# Patient Record
Sex: FEMALE | Race: WHITE | NOT HISPANIC OR LATINO | Employment: OTHER | URBAN - METROPOLITAN AREA
[De-identification: names, ages, dates, MRNs, and addresses within clinical notes are randomized per-mention and may not be internally consistent; named-entity substitution may affect disease eponyms.]

---

## 2017-04-18 ENCOUNTER — OFFICE VISIT - RIVER FALLS (OUTPATIENT)
Dept: FAMILY MEDICINE | Facility: CLINIC | Age: 62
End: 2017-04-18

## 2017-10-09 ENCOUNTER — AMBULATORY - RIVER FALLS (OUTPATIENT)
Dept: FAMILY MEDICINE | Facility: CLINIC | Age: 62
End: 2017-10-09

## 2017-10-10 LAB
CHOLEST SERPL-MCNC: 245 MG/DL
CHOLEST/HDLC SERPL: 1.8 {RATIO}
GLUCOSE BLD-MCNC: 88 MG/DL (ref 65–99)
HDLC SERPL-MCNC: 134 MG/DL
LDLC SERPL CALC-MCNC: 97 MG/DL
NONHDLC SERPL-MCNC: 111 MG/DL
TRIGL SERPL-MCNC: 62 MG/DL

## 2017-10-12 ENCOUNTER — OFFICE VISIT - RIVER FALLS (OUTPATIENT)
Dept: FAMILY MEDICINE | Facility: CLINIC | Age: 62
End: 2017-10-12

## 2017-10-12 ASSESSMENT — MIFFLIN-ST. JEOR: SCORE: 1138.93

## 2018-10-12 ENCOUNTER — AMBULATORY - RIVER FALLS (OUTPATIENT)
Dept: FAMILY MEDICINE | Facility: CLINIC | Age: 63
End: 2018-10-12
Payer: MEDICARE

## 2018-10-13 LAB
CHOLEST SERPL-MCNC: 252 MG/DL
CHOLEST/HDLC SERPL: 2 {RATIO}
CREAT SERPL-MCNC: 0.77 MG/DL (ref 0.5–0.99)
GLUCOSE BLD-MCNC: 86 MG/DL (ref 65–99)
HDLC SERPL-MCNC: 127 MG/DL
LDLC SERPL CALC-MCNC: 111 MG/DL
NONHDLC SERPL-MCNC: 125 MG/DL
TRIGL SERPL-MCNC: 53 MG/DL

## 2018-10-18 ENCOUNTER — OFFICE VISIT - RIVER FALLS (OUTPATIENT)
Dept: FAMILY MEDICINE | Facility: CLINIC | Age: 63
End: 2018-10-18
Payer: MEDICARE

## 2018-10-18 ASSESSMENT — MIFFLIN-ST. JEOR: SCORE: 1199.37

## 2018-11-09 ENCOUNTER — AMBULATORY - RIVER FALLS (OUTPATIENT)
Dept: FAMILY MEDICINE | Facility: CLINIC | Age: 63
End: 2018-11-09
Payer: COMMERCIAL

## 2019-10-14 ENCOUNTER — COMMUNICATION - RIVER FALLS (OUTPATIENT)
Dept: FAMILY MEDICINE | Facility: CLINIC | Age: 64
End: 2019-10-14
Payer: MEDICARE

## 2019-10-23 ENCOUNTER — AMBULATORY - RIVER FALLS (OUTPATIENT)
Dept: FAMILY MEDICINE | Facility: CLINIC | Age: 64
End: 2019-10-23
Payer: MEDICARE

## 2019-10-24 LAB
BUN SERPL-MCNC: 9 MG/DL (ref 7–25)
BUN/CREAT RATIO - HISTORICAL: NORMAL (ref 6–22)
CALCIUM SERPL-MCNC: 9.1 MG/DL (ref 8.6–10.4)
CHLORIDE BLD-SCNC: 106 MMOL/L (ref 98–110)
CHOLEST SERPL-MCNC: 227 MG/DL
CHOLEST/HDLC SERPL: 2.2 {RATIO}
CO2 SERPL-SCNC: 27 MMOL/L (ref 20–32)
CREAT SERPL-MCNC: 0.82 MG/DL (ref 0.5–0.99)
EGFRCR SERPLBLD CKD-EPI 2021: 75 ML/MIN/1.73M2
GLUCOSE BLD-MCNC: 92 MG/DL (ref 65–99)
HDLC SERPL-MCNC: 102 MG/DL
HGB BLD-MCNC: 13.1 GM/DL (ref 11.7–15.5)
LDLC SERPL CALC-MCNC: 110 MG/DL
NONHDLC SERPL-MCNC: 125 MG/DL
POTASSIUM BLD-SCNC: 4.3 MMOL/L (ref 3.5–5.3)
SODIUM SERPL-SCNC: 141 MMOL/L (ref 135–146)
TRIGL SERPL-MCNC: 61 MG/DL

## 2019-11-01 ENCOUNTER — OFFICE VISIT - RIVER FALLS (OUTPATIENT)
Dept: FAMILY MEDICINE | Facility: CLINIC | Age: 64
End: 2019-11-01
Payer: MEDICARE

## 2019-11-01 ASSESSMENT — MIFFLIN-ST. JEOR: SCORE: 1205.72

## 2019-11-04 ENCOUNTER — COMMUNICATION - RIVER FALLS (OUTPATIENT)
Dept: FAMILY MEDICINE | Facility: CLINIC | Age: 64
End: 2019-11-04
Payer: MEDICARE

## 2019-11-08 ENCOUNTER — OFFICE VISIT - RIVER FALLS (OUTPATIENT)
Dept: FAMILY MEDICINE | Facility: CLINIC | Age: 64
End: 2019-11-08
Payer: MEDICARE

## 2020-01-20 LAB — COLOGUARD-ABSTRACT: NEGATIVE

## 2020-03-16 ENCOUNTER — COMMUNICATION - RIVER FALLS (OUTPATIENT)
Dept: FAMILY MEDICINE | Facility: CLINIC | Age: 65
End: 2020-03-16
Payer: MEDICARE

## 2020-05-22 ENCOUNTER — OFFICE VISIT - RIVER FALLS (OUTPATIENT)
Dept: FAMILY MEDICINE | Facility: CLINIC | Age: 65
End: 2020-05-22
Payer: MEDICARE

## 2020-05-22 ENCOUNTER — AMBULATORY - RIVER FALLS (OUTPATIENT)
Dept: FAMILY MEDICINE | Facility: CLINIC | Age: 65
End: 2020-05-22
Payer: MEDICARE

## 2020-05-22 ENCOUNTER — COMMUNICATION - RIVER FALLS (OUTPATIENT)
Dept: FAMILY MEDICINE | Facility: CLINIC | Age: 65
End: 2020-05-22
Payer: MEDICARE

## 2020-05-22 LAB
ALBUMIN UR-MCNC: ABNORMAL G/DL
BACTERIA #/AREA URNS HPF: NORMAL /[HPF]
BILIRUB UR QL STRIP: NEGATIVE
EPITHELIAL CELLS UR: NORMAL
GLUCOSE UR STRIP-MCNC: NEGATIVE MG/DL
HGB UR QL STRIP: ABNORMAL
KETONES UR STRIP-MCNC: NEGATIVE MG/DL
LEUKOCYTE ESTERASE UR QL STRIP: ABNORMAL
NITRATE UR QL: NEGATIVE
PH UR STRIP: 6 [PH] (ref 5–8)
RBC #/AREA URNS AUTO: NORMAL /[HPF]
SP GR UR STRIP: ABNORMAL (ref 1–1.03)
WBC #/AREA URNS AUTO: NORMAL /[HPF]
WBC CLUMPS #/AREA URNS HPF: PRESENT /[HPF]

## 2020-06-01 ENCOUNTER — COMMUNICATION - RIVER FALLS (OUTPATIENT)
Dept: FAMILY MEDICINE | Facility: CLINIC | Age: 65
End: 2020-06-01
Payer: MEDICARE

## 2020-08-19 ENCOUNTER — COMMUNICATION - RIVER FALLS (OUTPATIENT)
Dept: FAMILY MEDICINE | Facility: CLINIC | Age: 65
End: 2020-08-19
Payer: MEDICARE

## 2020-10-16 ENCOUNTER — COMMUNICATION - RIVER FALLS (OUTPATIENT)
Dept: FAMILY MEDICINE | Facility: CLINIC | Age: 65
End: 2020-10-16
Payer: MEDICARE

## 2020-11-09 ENCOUNTER — AMBULATORY - RIVER FALLS (OUTPATIENT)
Dept: FAMILY MEDICINE | Facility: CLINIC | Age: 65
End: 2020-11-09
Payer: MEDICARE

## 2020-11-10 ENCOUNTER — COMMUNICATION - RIVER FALLS (OUTPATIENT)
Dept: FAMILY MEDICINE | Facility: CLINIC | Age: 65
End: 2020-11-10
Payer: MEDICARE

## 2020-11-10 LAB
BUN SERPL-MCNC: 15 MG/DL (ref 7–25)
BUN/CREAT RATIO - HISTORICAL: NORMAL (ref 6–22)
CALCIUM SERPL-MCNC: 9.3 MG/DL (ref 8.6–10.4)
CHLORIDE BLD-SCNC: 101 MMOL/L (ref 98–110)
CHOLEST SERPL-MCNC: 245 MG/DL
CHOLEST/HDLC SERPL: 2.2 {RATIO}
CO2 SERPL-SCNC: 27 MMOL/L (ref 20–32)
CREAT SERPL-MCNC: 0.74 MG/DL (ref 0.5–0.99)
EGFRCR SERPLBLD CKD-EPI 2021: 84 ML/MIN/1.73M2
GLUCOSE BLD-MCNC: 90 MG/DL (ref 65–99)
HDLC SERPL-MCNC: 111 MG/DL
HGB BLD-MCNC: 14.2 GM/DL (ref 11.7–15.5)
LDLC SERPL CALC-MCNC: 120 MG/DL
NONHDLC SERPL-MCNC: 134 MG/DL
POTASSIUM BLD-SCNC: 4.2 MMOL/L (ref 3.5–5.3)
SODIUM SERPL-SCNC: 136 MMOL/L (ref 135–146)
TRIGL SERPL-MCNC: 53 MG/DL

## 2020-12-02 ENCOUNTER — OFFICE VISIT - RIVER FALLS (OUTPATIENT)
Dept: FAMILY MEDICINE | Facility: CLINIC | Age: 65
End: 2020-12-02
Payer: MEDICARE

## 2021-01-25 ENCOUNTER — COMMUNICATION - RIVER FALLS (OUTPATIENT)
Dept: FAMILY MEDICINE | Facility: CLINIC | Age: 66
End: 2021-01-25
Payer: MEDICARE

## 2021-03-05 ENCOUNTER — COMMUNICATION - RIVER FALLS (OUTPATIENT)
Dept: FAMILY MEDICINE | Facility: CLINIC | Age: 66
End: 2021-03-05
Payer: MEDICARE

## 2021-06-02 ENCOUNTER — RECORDS - HEALTHEAST (OUTPATIENT)
Dept: ADMINISTRATIVE | Facility: CLINIC | Age: 66
End: 2021-06-02

## 2021-09-23 ENCOUNTER — OFFICE VISIT - RIVER FALLS (OUTPATIENT)
Dept: FAMILY MEDICINE | Facility: CLINIC | Age: 66
End: 2021-09-23
Payer: MEDICARE

## 2021-12-07 ENCOUNTER — AMBULATORY - RIVER FALLS (OUTPATIENT)
Dept: FAMILY MEDICINE | Facility: CLINIC | Age: 66
End: 2021-12-07
Payer: MEDICARE

## 2021-12-08 ENCOUNTER — COMMUNICATION - RIVER FALLS (OUTPATIENT)
Dept: FAMILY MEDICINE | Facility: CLINIC | Age: 66
End: 2021-12-08
Payer: MEDICARE

## 2021-12-08 LAB
BUN SERPL-MCNC: 8 MG/DL (ref 7–25)
BUN/CREAT RATIO - HISTORICAL: NORMAL (ref 6–22)
CALCIUM SERPL-MCNC: 9 MG/DL (ref 8.6–10.4)
CHLORIDE BLD-SCNC: 104 MMOL/L (ref 98–110)
CHOLEST SERPL-MCNC: 250 MG/DL
CHOLEST/HDLC SERPL: 2.3 {RATIO}
CO2 SERPL-SCNC: 29 MMOL/L (ref 20–32)
CREAT SERPL-MCNC: 0.79 MG/DL (ref 0.5–0.99)
EGFRCR SERPLBLD CKD-EPI 2021: 77 ML/MIN/1.73M2
GLUCOSE BLD-MCNC: 84 MG/DL (ref 65–99)
HDLC SERPL-MCNC: 110 MG/DL
HGB BLD-MCNC: 13.1 GM/DL (ref 11.7–15.5)
LDLC SERPL CALC-MCNC: 125 MG/DL
NONHDLC SERPL-MCNC: 140 MG/DL
POTASSIUM BLD-SCNC: 4.3 MMOL/L (ref 3.5–5.3)
SODIUM SERPL-SCNC: 140 MMOL/L (ref 135–146)
TRIGL SERPL-MCNC: 59 MG/DL

## 2021-12-14 ENCOUNTER — TRANSFERRED RECORDS (OUTPATIENT)
Dept: MULTI SPECIALTY CLINIC | Facility: CLINIC | Age: 66
End: 2021-12-14

## 2021-12-14 ENCOUNTER — OFFICE VISIT - RIVER FALLS (OUTPATIENT)
Dept: FAMILY MEDICINE | Facility: CLINIC | Age: 66
End: 2021-12-14
Payer: MEDICARE

## 2021-12-14 ASSESSMENT — MIFFLIN-ST. JEOR: SCORE: 1225.11

## 2022-02-11 VITALS — DIASTOLIC BLOOD PRESSURE: 72 MMHG | TEMPERATURE: 97.3 F | SYSTOLIC BLOOD PRESSURE: 140 MMHG | HEART RATE: 64 BPM

## 2022-02-12 VITALS
WEIGHT: 158.4 LBS | DIASTOLIC BLOOD PRESSURE: 78 MMHG | BODY MASS INDEX: 29.15 KG/M2 | HEIGHT: 62 IN | HEART RATE: 60 BPM | TEMPERATURE: 97 F | SYSTOLIC BLOOD PRESSURE: 136 MMHG

## 2022-02-12 VITALS
WEIGHT: 157 LBS | BODY MASS INDEX: 28.89 KG/M2 | TEMPERATURE: 98.5 F | SYSTOLIC BLOOD PRESSURE: 108 MMHG | HEART RATE: 80 BPM | HEIGHT: 62 IN | DIASTOLIC BLOOD PRESSURE: 62 MMHG

## 2022-02-12 VITALS
DIASTOLIC BLOOD PRESSURE: 80 MMHG | TEMPERATURE: 96.8 F | BODY MASS INDEX: 25.3 KG/M2 | HEIGHT: 63 IN | WEIGHT: 142.8 LBS | HEART RATE: 56 BPM | SYSTOLIC BLOOD PRESSURE: 128 MMHG

## 2022-02-12 VITALS
SYSTOLIC BLOOD PRESSURE: 131 MMHG | HEART RATE: 47 BPM | WEIGHT: 161.8 LBS | HEIGHT: 63 IN | OXYGEN SATURATION: 97 % | DIASTOLIC BLOOD PRESSURE: 70 MMHG | BODY MASS INDEX: 28.67 KG/M2 | TEMPERATURE: 97.7 F

## 2022-02-12 VITALS
BODY MASS INDEX: 30.12 KG/M2 | DIASTOLIC BLOOD PRESSURE: 79 MMHG | SYSTOLIC BLOOD PRESSURE: 145 MMHG | WEIGHT: 166 LBS | HEART RATE: 52 BPM

## 2022-02-15 NOTE — PROGRESS NOTES
Patient:   JANKI QUINTERO            MRN: [id number]            FIN: [id number]               Age:   65 years     Sex:  Female     :  1955   Associated Diagnoses:   Essential hypertension; Physical exam   Author:   Messi Park MD      Visit Information      Date of Service: 2020 06:48 am  Performing Location: [facility]  Encounter#: [id number]      Primary Care Provider (PCP):  Messi Park MD    NPI# [id number]      Referring Provider:  Messi Park MD    NPI# [id number]   Visit type:  Video Visit via [employer].    Participants in room during visit:  patient   Location of patient:  patient  Location of provider:  _ (Clinic office or Home office)  Video Start Time:  10:00  Video End Time:   10:25    Today's visit was conducted via video conference due to the COVID-19 pandemic.  The patient's consent to proceed with a video visit has been obtained and documented.      Chief Complaint   2020 10:19 AM CST   Video visit - f/u chronic - verbal lconsent for video visit        History of Present Illness   I spoke with Janki via video conference for general follow-up.  She has been doing as well as can be expected.  Understandably still grieving after her  s loss earlier this year compounded by the Covid pandemic.  She has remained healthy.  She has been socially isolating.  We discussed at length recent developments related to vaccine trials.  She describes her process is appropriate grieving.  Does have issues with depression in the past though does not feel like this is depression.         Review of Systems   Constitutional:  Negative.    Eye:  Negative.    Ear/Nose/Mouth/Throat:  Negative.    Respiratory:  Negative.    Cardiovascular:  Negative.    Gastrointestinal:  Negative.    Genitourinary:  Negative.    Hematology/Lymphatics:  Bruising tendency.    Immunologic:  Negative.    Musculoskeletal:  Negative.    Integumentary:  Negative.    Neurologic:  Negative.    Psychiatric:   Anxiety, No depression.       Health Status   Allergies:    Allergic Reactions (Selected)  No Known Medication Allergies   Medications:  (Selected)   Prescriptions  Prescribed  lisinopril 10 mg oral tablet: = 1 tab(s), Oral, daily, # 90 tab(s), 3 Refill(s), Type: Maintenance, Pharmacy: [facility] #[id number], due for appt, 1 tab(s) Oral daily, 62.25, in, 11/01/19 10:24:00 CDT, Height Measured, 158.4, lb, 11/01/19 10:24:00 CDT, Weight Measured  Documented Medications  Documented  Vitamin B Complex with C and Folic Acid oral capsule: 1 cap(s), po, daily, 0 Refill(s), Type: Maintenance  Vitamin D3 1000 intl units oral tablet: 2 tab(s) ( 2,000 International Unit ), po, daily, 0 Refill(s), Type: Maintenance  aspirin 81 mg oral tablet: 1 tab(s) ( 81 mg ), po, daily, 0 Refill(s), Type: Maintenance  vitamin E 400 intl units oral capsule: 1 cap(s) ( 400 International Unit ), po, daily, 0 Refill(s), Type: Maintenance,    Medications          *denotes recorded medication          *aspirin 81 mg oral tablet: 81 mg, 1 tab(s), po, daily, 0 Refill(s).          *Vitamin D3 1000 intl units oral tablet: 2,000 International Unit, 2 tab(s), po, daily, 0 Refill(s).          lisinopril 10 mg oral tablet: 1 tab(s), Oral, daily, 90 tab(s), 3 Refill(s).          *Vitamin B Complex with C and Folic Acid oral capsule: 1 cap(s), po, daily, 0 Refill(s).          *vitamin E 400 intl units oral capsule: 400 International Unit, 1 cap(s), po, daily, 0 Refill(s).       Problem list:    All Problems  Sarcoidosis / SNOMED CT 4023882997 / Confirmed  Essential hypertension / SNOMED CT 94073716 / Confirmed  Hilar adenopathy / SNOMED CT 800890172 / Confirmed  History of breast cancer / SNOMED CT 7648219675 / Confirmed  Menopausal state / SNOMED CT 536054474 / Confirmed      Histories   Past Medical History:    Active  History of breast cancer (0987928087): Onset in the month of 4/2011 at 56 years  Comments:  11/14/2016 CST 5:03 PM YOVANI Burrouhgs  Giulia  Stage t1c, n1  ER-positive; s/p XRT; s/p chemo  Menopausal state (918687088): Onset in  at 52 years.  Essential hypertension (61108304)  Hilar adenopathy (925806041)  Sarcoidosis (4482114464)  Resolved  Pregnancy (541238471):  Resolved in  at 21 years.  Pregnancy (278031950):  Resolved in  at 24 years.  Pregnancy (031573080):  Resolved in  at 27 years.   Family History:    Thyroid  Daughter (Mary)  Anemia  Great Grandmother (M) (Melany Lynn, )  CVA - Cerebrovascular accident  Father ()  CAD - Coronary artery disease  Father ()  Breast Cancer  Mother (): onset at 71 .  Comments:  2016 9:16 AM Gertrude More  Stage 4 at the time of the diagnosis.  Grandmother (P) (Ariela Barbosa, ): onset at 89 .     Procedure history:    Screening for malignant neoplasm of colon (5322622205) on 2020 at 64 Years.  Comments:  2020 8:49 AM Megha Jaramillo - Negative  Date of last mammogram (9875208020) on 3/8/2018 at 62 Years.  Screening for malignant neoplasm of colon (7078110674) on 2016 at 61 Years.  Comments:  2017 8:14 AM Nicolasa Spears MA result--negative  Recommendation:   f/u 3yrs  Breast cancer (096244026) in the month of 3/2011 at 55 Years.  Comments:  2016 9:30 AM Gertrude More  One positive sentinel node.    2016 9:13 AM Gertrude More  Right invasive ductal.  Wide local excision.  Tubal ligation (049199873) on 1997 at 42 Years.  Tonsillectomy (619712714).  Childbirth (6162755574).  Comments:  2016 9:25 AM Gertrude More  x 3   Social History:        Electronic Cigarette/Vaping Assessment            Electronic Cigarette Use: Never.      Alcohol Assessment            Current, Wine (5 oz), 1-2 times per month, 1 drinks/episode average.  Ready to change: No.      Tobacco Assessment            Never (less than 100 in lifetime)            Never (less than 100 in  lifetime)      Substance Abuse Assessment            Never      Employment and Education Assessment            Retired, Work/School description: .  Previous employment/school: ..      Home and Environment Assessment             Marital status:Tirso Chen Spouse/Partner name:.      Nutrition and Health Assessment            Type of diet: Regular.  Wants to lose weight: Yes.  Sleeping concerns: No.  Feels highly stressed: Yes.      Exercise and Physical Activity Assessment            Exercise frequency: Daily.  Exercise type: Walking, Yoga.      Sexual Assessment            Sexually active: Yes.  Sexual orientation: Straight or heterosexual.  Contraceptive Use Details: None.        Physical Examination   General:  Alert and oriented, No acute distress.    Eye:  Normal conjunctiva.    HENT:  Oral mucosa is moist.    Respiratory:  Respirations are non-labored.    Psychiatric:  Cooperative, Appropriate mood & affect, Normal judgment.       Impression and Plan   Diagnosis     Essential hypertension (IUU46-PL I10).     Physical exam (FLR15-UC Z00.00).        Review / Management     .) h/o stage I breast Ca, '11; + sentinel node; negative right axillary LN dissection; high Onco-score   - treated with XRT and chemo   - completed 4.5 yrs of Arimidex; stopped due to polyarthralgia   - maintains surveillance through [facility] for mammography    .) hypertension, controlled   - lisinopril 10mg daily    .) health maintenance   - DEXA, '19 - normal   - no longer needing PAP   - normal Cologuard (1/2020); repeat '23   - adult vaccinations updated   - expected grieving with deterioration of Gustavo's health    .) Covid19 pandemic  - discussed importance of social distancing, hand hygiene, and unique aspects related to individualized health  - discussed s/s and appropriate measures in context of worsening or developing respiratory symptoms     RTC annually

## 2022-02-15 NOTE — TELEPHONE ENCOUNTER
Entered by Gertrude Bennett CMA on October 14, 2019 8:19:37 AM CDT  ---------------------  From: Gertrude Bennett CMA   [id number]    Sent: 10/14/2019 8:19:37 AM CDT  Subject: Medication Management     ** Submitted: **  Order:lisinopril (lisinopril 10 mg oral tablet)  1 tab(s)  Oral  daily  Qty:  30 tab(s)        Refills:  0          Substitutions Allowed     Route To [id number]    Signed by Gertrude Bennett CMA  10/14/2019 8:19:00 AM    ** Submitted: **  Complete:lisinopril (lisinopril 10 mg oral tablet)   Signed by Gertrude Bennett CMA  10/14/2019 8:19:00 AM    ** Not Approved:  **  lisinopril (LISINOPRIL 10MG TABLETS)  TAKE ONE TABLET BY MOUTH EVERY DAY  Qty:  90 tab(s)        Days Supply:  90        Refills:  0          Substitutions Allowed     Route To [id number]   Signed by Gertrude Bennett CMA            ** Patient matched by Gertrude Bennett CMA on 10/14/2019 8:19:15 AM CDT **      ------------------------------------------  [id number]  To: Messi Park MD  Sent: October 12, 2019 6:59:47 AM CDT  Subject: Medication Management  Due: October 13, 2019 6:59:47 AM CDT    ** On Hold Pending Signature **  Drug: lisinopril (lisinopril 10 mg oral tablet)  TAKE ONE TABLET BY MOUTH EVERY DAY  Quantity: 90 tab(s)  Days Supply: 90  Refills: 0  Substitutions Allowed  Notes from Pharmacy:     Dispensed Drug: lisinopril (lisinopril 10 mg oral tablet)  TAKE ONE TABLET BY MOUTH EVERY DAY  Quantity: 90 tab(s)  Days Supply: 90  Refills: 0  Substitutions Allowed  Notes from Pharmacy:   ------------------------------------------pt sent message stating she's due for appt and asking to complete labs ahead of time

## 2022-02-15 NOTE — NURSING NOTE
Comprehensive Intake Entered On:  2021 3:43 PM CDT    Performed On:  2021 3:37 PM CDT by Aliya HERNANDEZRissaStephie               Summary   Chief Complaint :   R foot pain with flexing. 2021 developed bone spur.    Advance Directive :   Yes   Weight Measured :   166 lb(Converted to: 166 lb 0 oz, 75.296 kg)    Systolic Blood Pressure :   145 mmHg (HI)    Diastolic Blood Pressure :   79 mmHg   Mean Arterial Pressure :   101 mmHg   Peripheral Pulse Rate :   52 bpm (LOW)    BP Site :   Right arm   Pulse Site :   Radial artery   BP Method :   Electronic   HR Method :   Electronic   Stephie Pisano CMA - 2021 3:37 PM CDT   Health Status   Allergies Verified? :   Yes   Medication History Verified? :   Yes   Pre-Visit Planning Status :   Not completed   Tobacco Use? :   Never smoker   Stephie Pisano CMA - 2021 3:37 PM CDT   Demographics   Last Name :   INGRID   Address :   [address]   First Name :   JANKI Terry Initial :   NICOLE   Responsible Party Date of Birth () :   1955 CST   City :   [place]   State :   [place]   Zip Code :   [address]   Contact Relationship to Patient (other) :   Patient   Stephie Pisano CMA - 2021 3:37 PM CDT   Providers Grid   Provider Name :    JESSY Christianson Dr., Dr.        Provider Specialty :    [place]   dentist   optometrist          Stephie Pisano CMA - 2021 3:37 PM CDT  Stephie Pisano CMA 2021 3:37 PM CDT  Stephie Pisano CMA - 2021 3:37 PM CDT       Ancillary Services Grid   Name :    [facility]              Type of Service :    Other: Breast Clinic                Stephie Pisano CMA - 2021 3:37 PM CDT         Consents   Consent for Immunization Exchange :   Consent Granted   Consent for Immunizations to Providers :   Consent Granted   Stephie Pisano CMA - 2021 3:37 PM CDT   Meds / Allergies   (As Of: 2021 3:43:01 PM CDT)   Allergies (Active)   No Known Medication Allergies  Estimated Onset  Date:   Unspecified ; Created By:   Giulia Burroughs; Reaction Status:   Active ; Category:   Drug ; Substance:   No Known Medication Allergies ; Type:   Allergy ; Updated By:   Giulia Burroughs; Reviewed Date:   11/14/2016 4:59 PM CST        Medication List   (As Of: 9/23/2021 3:43:01 PM CDT)   Prescription/Discharge Order    lisinopril  :   lisinopril ; Status:   Prescribed ; Ordered As Mnemonic:   lisinopril 10 mg oral tablet ; Simple Display Line:   1 tab(s), Oral, daily, 90 tab(s), 3 Refill(s) ; Ordering Provider:   Messi Park MD; Catalog Code:   lisinopril ; Order Dt/Tm:   12/2/2020 10:58:29 AM CST            Home Meds    aspirin  :   aspirin ; Status:   Documented ; Ordered As Mnemonic:   aspirin 81 mg oral tablet ; Simple Display Line:   81 mg, 1 tab(s), po, daily, 0 Refill(s) ; Catalog Code:   aspirin ; Order Dt/Tm:   11/29/2016 8:01:32 AM CST          cholecalciferol  :   cholecalciferol ; Status:   Documented ; Ordered As Mnemonic:   Vitamin D3 1000 intl units oral tablet ; Simple Display Line:   2,000 International Unit, 2 tab(s), po, daily, 0 Refill(s) ; Catalog Code:   cholecalciferol ; Order Dt/Tm:   11/29/2016 7:27:00 AM CST          multivitamin  :   multivitamin ; Status:   Documented ; Ordered As Mnemonic:   Vitamin B Complex with C and Folic Acid oral capsule ; Simple Display Line:   1 cap(s), po, daily, 0 Refill(s) ; Catalog Code:   multivitamin ; Order Dt/Tm:   11/29/2016 7:27:26 AM CST          vitamin E  :   vitamin E ; Status:   Documented ; Ordered As Mnemonic:   vitamin E 400 intl units oral capsule ; Simple Display Line:   400 International Unit, 1 cap(s), po, daily, 0 Refill(s) ; Catalog Code:   vitamin E ; Order Dt/Tm:   10/12/2017 1:07:05 PM CDT

## 2022-02-15 NOTE — NURSING NOTE
Comprehensive Intake Entered On:  12/2/2020 10:22 AM CST    Performed On:  12/2/2020 10:19 AM CST by Gertrude Bennett CMA               Summary   Chief Complaint :   Video visit - f/u chronic - verbal lconsent for video visit   Advance Directive :   Yes   JaylenGertrude bear CMA - 12/2/2020 10:19 AM CST   Health Status   Allergies Verified? :   Yes   Medication History Verified? :   Yes   Medical History Verified? :   Yes   Pre-Visit Planning Status :   Completed   Tobacco Use? :   Never smoker   Gertrude Bennett CMA - 12/2/2020 10:19 AM CST   ID Risk Screen   Recent Travel History :   No recent travel   Family Member Travel History :   No recent travel   Other Exposure to Infectious Disease :   Unknown   Gertrude Bennett CMA - 12/2/2020 10:19 AM CST   Social History   Social History   (As Of: 12/2/2020 10:22:35 AM CST)   Alcohol:        Current, Wine (5 oz), 1-2 times per month, 1 drinks/episode average.  Ready to change: No.   (Last Updated: 11/1/2019 4:23:12 PM CDT by Valentina Georges)          Tobacco:        Never (less than 100 in lifetime)   (Last Updated: 11/1/2019 4:22:55 PM CDT by Valentina Georges)   Never (less than 100 in lifetime)   (Last Updated: 12/2/2020 10:19:31 AM CST by Gertrude Bennett CMA)          Electronic Cigarette/Vaping:        Electronic Cigarette Use: Never.   (Last Updated: 12/2/2020 10:19:37 AM CST by Gertrude Bennett CMA)          Substance Abuse:        Never   (Last Updated: 10/19/2017 10:27:17 AM CDT by Chantel Bravo)          Employment/School:        Retired, Work/School description: .  Previous employment/school: ..   (Last Updated: 12/30/2016 1:42:23 PM CST by Lisa Lee)          Home/Environment:         Marital status:Tirso Chen Spouse/Partner name:.   (Last Updated: 1/3/2019 8:18:33 PM UTC by Lena Beard CMA)          Nutrition/Health:        Type of diet: Regular.  Wants to lose weight: Yes.  Sleeping concerns: No.  Feels highly stressed: Yes.   (Last Updated:  11/1/2019 4:23:42 PM CDT by Valentina Georges)          Exercise:        Exercise frequency: Daily.  Exercise type: Walking, Yoga.   (Last Updated: 10/19/2017 10:27:33 AM CDT by Chantel Bravo)          Sexual:        Sexually active: Yes.  Sexual orientation: Straight or heterosexual.  Contraceptive Use Details: None.   (Last Updated: 10/19/2017 10:27:40 AM CDT by Chantel Bravo)

## 2022-02-15 NOTE — LETTER
(Inserted Image. Unable to display)   January 17, 2020      JANKI QUINTERO  [id number] [place], [place] [id number]        Dear JANKI,      Thank you for selecting [facility] ([facility]) for your healthcare needs.      Cologuard results are normal.  Current recommendations to repeat testing every 3 years.            Please contact me or my assistant at [phone] if you have any questions or concerns.     Sincerely,        Messi Park MD

## 2022-02-15 NOTE — TELEPHONE ENCOUNTER
---------------------  From: Mattie Valles CMA (Phone Messages Pool ([id number]_[facility]))   To: [facility] Message Pool ([id number]_[place]);     Sent: 5/22/2020 9:37:34 AM CDT  Subject: fever, headache     Phone message    PCP: MIKAYLA     Person calling: Lynne  Phone number: [phone] ok LVM  Time message left: 0917  Return call time: _    Reason: Lynne called and left a message stating that yesterday she had the sudden onset of chills and   fever of 102, this morning she has a temp of 100 and a headache. She states she has no other symptoms and   has been home for the past 4 weeks other then going to the grocery store, she is wondering what she should do, given   that there have been positive COVID in the community, does she need testing or just monitor symptoms as needed.   She is requesting a call back with what she should do    LOV: 11/2019 AWV with MIKAYLA      Transferred to: [facility]          EDWINA Valles CMA---------------------  From: Gertrude Bennett CMA ([employer] Message Pool ([id number]))   To: Messi Park MD;     Sent: 5/22/2020 9:58:40 AM CDT  Subject: FW: fever, headache     Would you advise telemed visit??  Please advise---------------------  From: Messi Park MD   To: [facility] Message Pool ([id number]_[place]);     Sent: 5/22/2020 12:07:44 PM CDT  Subject: RE: fever, headache     I'd recommend televisit, and if no other obvious source of infection (UTI), likely will direct to curbside testing todaypt contacted and scheduled

## 2022-02-15 NOTE — TELEPHONE ENCOUNTER
---------------------  From: Messi Park MD   To: JANKI QUINTERO    Sent: 12/8/2021 1:59:29 PM CST  Subject: General Message      Labs for your review.  We can discuss in more detail at future clinic visit.       Results:  Date Result Name Ind Value Ref Range   12/7/2021 8:00 AM Cholesterol ((H)) 250 mg/dL ( - <200)   12/7/2021 8:00 AM HDL  110 mg/dL (> OR = 50 - )   12/7/2021 8:00 AM Triglyceride  59 mg/dL ( - <150)   12/7/2021 8:00 AM LDL ((H)) 125    12/7/2021 8:00 AM Cholesterol/HDL Ratio  2.3 ( - <5.0)   12/7/2021 8:00 AM Non-HDL Cholesterol ((H)) 140 ( - <130)   12/7/2021 8:00 AM Glucose Level  84 mg/dL (65 - 99)   12/7/2021 8:00 AM BUN  8 mg/dL (7 - 25)   12/7/2021 8:00 AM Creatinine Level  0.79 mg/dL (0.50 - 0.99)   12/7/2021 8:00 AM eGFR  77 mL/min/1.73m2 (> OR = 60 - )   12/7/2021 8:00 AM eGFR African American  89 mL/min/1.73m2 (> OR = 60 - )   12/7/2021 8:00 AM BUN/Creat Ratio  NOT APPLICABLE (6 - 22)   12/7/2021 8:00 AM Sodium Level  140 mmol/L (135 - 146)   12/7/2021 8:00 AM Potassium Level  4.3 mmol/L (3.5 - 5.3)   12/7/2021 8:00 AM Chloride Level  104 mmol/L (98 - 110)   12/7/2021 8:00 AM CO2 Level  29 mmol/L (20 - 32)   12/7/2021 8:00 AM Calcium Level  9.0 mg/dL (8.6 - 10.4)   12/7/2021 8:00 AM Hgb  13.1 gm/dL (11.7 - 15.5)

## 2022-02-15 NOTE — LETTER
(Inserted Image. Unable to display)   October 16, 2020      JANKI QUINTERO  [id number] [place], [place] [phone]        Dear JANKI,      Thank you for selecting [facility] ([facility]viously [facility]) for your healthcare needs.      This letter is to inform you that we have received a copy of your mammogram results.  Your results were normal unless noted below.  You will also receive notice of your results from the radiologist within 7-10 days.  This letter is to let you know I have also received a copy and it is filed in your clinic chart.      Please plan on having your next mammogram done in 12 months.          Please contact me or my assistant at [phone] if you have any questions or concerns.     Sincerely,        Messi Park MD

## 2022-02-15 NOTE — PROGRESS NOTES
Patient:   JANKI QUINTERO            MRN: [id number]            FIN: [id number]               Age:   66 years     Sex:  Female     :  1955   Associated Diagnoses:   None   Author:   Messi Park MD      Visit Information      Date of Service: 2021 08:43 am  Performing Location: [facility]  Encounter#: [id number]      Primary Care Provider (PCP):  Messi Park MD    NPI# [id number]      Referring Provider:  Messi Park MD    NPI# [id number]   Visit type:  Annual exam.       Chief Complaint   2021 9:16 AM CST   AWV     Medicare Initial / Annual Preventative Visit     HPI:    2021:Raegan returns for follow-up.  Overall doing okay.  Some aches and pains though nothing particularly concerning to her.  Did have mammogram follow-up through [facility] with [facility].  No specific concerns or questions.         Well Adult History     ADL's and Safety were reviewed.  None found.  Please see copy of scanned form.    I have reviewed with the patient the completed health risk assessment and health history form and we have agreed on the following plans for identified risk factors. None found.  Please see copy of scanned form.    No hearing impairment, No problems with Activities of Daily Living, Not at risk for falls and Home is safe.  Patient completed  Health Risk Assessment form and Patient Health History form were reviewed with the patient and any risks identified and plans to deal with these risks are identified in the Assessment and Plan below.  Other Health Care Providers are as currently listed in the Medical Record as a Quick Note for eye care, other medical specialists, health agencies and pharmacy and medical suppliers          Well Adult History             The patient presents for well adult exam.  I've reviewed and agree with above chief complaint and comments   .              Review of Systems   Eye:  See Preventative Services Checklist for Vision/Glaucoma Test dates..     Ear/Nose/Mouth/Throat:  Hearing is addressed and no impairment noted..    Respiratory:  No shortness of breath.    Cardiovascular:  No chest pain.    Gastrointestinal:  No nausea, No vomiting, No diarrhea.    Genitourinary:  No dysuria.    Musculoskeletal:  Joint pain, No neck pain.    Neurologic:  Alert and oriented X4.    Psychiatric:  GDS Noted  the GDS and the CAGE assessments were reviewed and discussed with the patient.    See completed Health History for Review of Systems.      Health Status   Allergies:    Allergic Reactions (Selected)  No Known Medication Allergies   Medications:  (Selected)   Prescriptions  Prescribed  lisinopril 10 mg oral tablet: = 1 tab(s), Oral, daily, # 90 tab(s), 3 Refill(s), Type: Maintenance, Pharmacy: [id number], due for appt, 1 tab(s) Oral daily, 62.25, in, 11/01/19 10:24:00 CDT, Height Measured, 158.4, lb, 11/01/19 10:24:00 CDT, Weight Measured  Documented Medications  Documented  Vitamin B Complex with C and Folic Acid oral capsule: 1 cap(s), po, daily, 0 Refill(s), Type: Maintenance  Vitamin D3 1000 intl units oral tablet: 2 tab(s) ( 2,000 International Unit ), po, daily, 0 Refill(s), Type: Maintenance  aspirin 81 mg oral tablet: 1 tab(s) ( 81 mg ), po, daily, 0 Refill(s), Type: Maintenance  vitamin E 400 intl units oral capsule: 1 cap(s) ( 400 International Unit ), po, daily, 0 Refill(s), Type: Maintenance,    Medications          *denotes recorded medication          *aspirin 81 mg oral tablet: 81 mg, 1 tab(s), po, daily, 0 Refill(s).          *Vitamin D3 1000 intl units oral tablet: 2,000 International Unit, 2 tab(s), po, daily, 0 Refill(s).          lisinopril 10 mg oral tablet: 1 tab(s), Oral, daily, 90 tab(s), 3 Refill(s).          *Vitamin B Complex with C and Folic Acid oral capsule: 1 cap(s), po, daily, 0 Refill(s).          *vitamin E 400 intl units oral capsule: 400 International Unit, 1 cap(s), po, daily, 0 Refill(s).       Problem list:    All  Problems  Sarcoidosis / SNOMED CT 6667129099 / Confirmed  Essential hypertension / SNOMED CT 74297257 / Confirmed  Hilar adenopathy / SNOMED CT 687665081 / Confirmed  History of breast cancer / SNOMED CT 3817414725 / Confirmed  Menopausal state / SNOMED CT 844533669 / Confirmed  Resolved: Pregnancy / SNOMED CT 540620967  Resolved: Pregnancy / SNOMED CT 545599167  Resolved: Pregnancy / SNOMED CT 457197856     Current Providers and Suppliers Noted in Demographic Area.      Histories   Past Medical History:    Active  History of breast cancer (1912788448): Onset in the month of 2011 at 56 years  Comments:  2016 CST 5:03 PM CST - Giulia Burroughs  Stage t1c, n1  ER-positive; s/p XRT; s/p chemo  Menopausal state (221499253): Onset in  at 52 years.  Essential hypertension (01481974)  Hilar adenopathy (910299521)  Sarcoidosis (3993660186)  Resolved  Pregnancy (941601119):  Resolved in  at 21 years.  Pregnancy (704584916):  Resolved in  at 24 years.  Pregnancy (888745146):  Resolved in  at 27 years.   Family History:    Thyroid  Daughter (Mary)  Anemia  Great Grandmother (M) (Melany Lynn, )  CVA - Cerebrovascular accident  Father ()  CAD - Coronary artery disease  Father ()  Breast Cancer  Mother (): onset at 71 .  Comments:  2016 9:16 AM CST - Gertrude Sevilla  Stage 4 at the time of the diagnosis.  Grandmother (P) (Ariela Barbosa, ): onset at 89 .     Procedure history:    Screening for malignant neoplasm of colon (0454351909) on 2020 at 64 Years.  Comments:  2020 8:49 AM CST - Megha Kaye  Cologuard - Negative  Date of last mammogram (5885818565) on 3/8/2018 at 62 Years.  Screening for malignant neoplasm of colon (0991671920) on 2016 at 61 Years.  Comments:  2017 8:14 AM CST - Nicolasa Drew MA  Cologuamaged result--negative  Recommendation:   f/u 3yrs  Breast cancer (338366320) in the month of 3/2011 at 55  Years.  Comments:  12/12/2016 9:30 AM Gertrude More  One positive sentinel node.    12/12/2016 9:13 AM Gertrude More  Right invasive ductal.  Wide local excision.  Tubal ligation (727968540) on 12/31/1997 at 42 Years.  Tonsillectomy (756905403).  Childbirth (9776851527).  Comments:  12/12/2016 9:25 AM Gertrude More  x 3   Social History:        Electronic Cigarette/Vaping Assessment            Electronic Cigarette Use: Never.            Electronic Cigarette Use: Never.      Alcohol Assessment            Current, Wine (5 oz), 1-2 times per month, 1 drinks/episode average.  Ready to change: No.      Tobacco Assessment            Never (less than 100 in lifetime)            Never (less than 100 in lifetime)            Never (less than 100 in lifetime)      Substance Abuse Assessment            Never      Employment and Education Assessment            Retired, Work/School description: .  Previous employment/school: ..      Home and Environment Assessment             Marital status:.  Gustavo Spouse/Partner name:.      Nutrition and Health Assessment            Type of diet: Regular.  Wants to lose weight: Yes.  Sleeping concerns: No.  Feels highly stressed: Yes.      Exercise and Physical Activity Assessment            Exercise frequency: Daily.  Exercise type: Walking, Yoga.      Sexual Assessment            Sexually active: Yes.  Sexual orientation: Straight or heterosexual.  Contraceptive Use Details: None.        Physical Examination   Exam at Provider Discretion   Vital Signs   12/14/2021 9:22 AM CST Systolic Blood Pressure 131 mmHg  HI    Diastolic Blood Pressure 70 mmHg    Mean Arterial Pressure 90 mmHg   12/14/2021 9:16 AM CST Temperature Tympanic 97.7 DegF  LOW    Peripheral Pulse Rate 47 bpm  LOW    Systolic Blood Pressure 138 mmHg  HI    Diastolic Blood Pressure 79 mmHg    Mean Arterial Pressure 99 mmHg    Oxygen Saturation 97 %      Eye:  Extraocular movements are intact.     HENT:  Normocephalic, Oral mucosa is moist.    Neck:  Supple.    Respiratory:  Lungs are clear to auscultation, Respirations are non-labored.    Cardiovascular:  Normal rate, Regular rhythm, No murmur, No edema.    Gastrointestinal:  Soft, Non-tender, Non-distended, Normal bowel sounds.    Lymphatics:  No lymphadenopathy neck, axilla, groin.    Musculoskeletal:  Normal range of motion.    Neurologic:  Alert, Oriented, Normal motor function, No focal deficits, No signs of cognitive impairment..    Psychiatric:  Appropriate mood & affect.       Review / Management   Results review:  INCLUDE PHQ 9.       Impression and Plan   Diagnosis     Medicare Initial / Annual Wellness Visit.     Course:  Progressing as expected.    Plan:  Please list plan for positive findings, treatment options, risk vs. benefits..    Patient Instructions:       Counseled: Patient, Discussed preventative services including  Screening for AAA (Family history or male 65-70 who has smoked more than 100 cigarettes in a lifetime.), Lipid screening, Diabetes screening, Nutrition, Bone mass, Cancer screening (cervical, breast, colon), Immunizations (flu, Pneumovax, Hep. B, Zostavax), Glaucoma screening and ECG if needed.  Appropriate weight and diet discussed.  Discussed Advance Life Directives.    Preventative services checklist reviewed, updated and copy given to patient.  Please see scanned document.       .    .) h/o stage I breast Ca, '11; + sentinel node; negative right axillary LN dissection; high Onco-score   - treated with XRT and chemo   - completed 4.5 yrs of Arimidex; stopped due to polyarthralgia   - maintains surveillance through [place] for mammography    .) hypertension, controlled   - lisinopril 10mg daily    .) health maintenance   - DEXA, '19 - normal     - no definitive plans to recheck   - PHQ9: normal   - no longer needing PAP   - normal Cologuard (1/2020); repeat in '23   - adult vaccinations updated   - completed COVID  vaccination + booster    RTC annually

## 2022-02-15 NOTE — TELEPHONE ENCOUNTER
---------------------  From: Yani Rodriguez LPN (Phone Messages Pool ([address] [place] [address]))   To: [employer] Message Pool ([address] [place] [address]);     Sent: 8/19/2020 3:56:37 PM CDT  Subject: CONSUMER MESSAGE FW: 2020 Flu Shot           ---------------------  From: PATRICIO QUINTERO on behalf of JANKI QUINTERO  To: [facility]  Sent: 08/19/2020 03:55 p.m. CDT  Subject: 2020 Flu Shot  I am wanting to know when Dr. Messi Park recommends that I get this year s flu shot.  I usually wait until the first week of October in order to go longer into the season.  The current situation makes the date unclear.    Janki Quintero---------------------  From: Gertrude Bennett CMA ([employer] Message Pool ([address] [place] [address]))   To: Messi Park MD;     Sent: 8/19/2020 4:03:05 PM CDT  Subject: FW: CONSUMER MESSAGE FW: 2020 Flu Shot---------------------  From: Messi Park MD   To: [employer] Message Pool ([address] [place] [address]);     Sent: 8/19/2020 4:53:35 PM CDT  Subject: RE: CONSUMER MESSAGE FW: 2020 Flu Shot     Earliest when feasible.  I don't believe timing influenza vaccination based on expected/forecasted season to be very effective.  During current COVID pandemic, it will be even more important to have has high an influenza vaccination rate as able.  I'm hopeful we won't even have much of an influenza season given preventative measures that are already in place for COVID preparedness.---------------------  From: Gertrude Bennett CMA ([employer] Message Pool ([address] [place] [address]))   To: JANKI QUINTERO    Sent: 8/20/2020 11:54:22 AM CDT  Subject: FW: CONSUMER MESSAGE FW: 2020 Flu Shot     Hi Janki,  Please see Dr Park's response.  We haven't received our shipment yet, usually get it in by end of August/beginning of September    Gertrude

## 2022-02-15 NOTE — NURSING NOTE
CAGE Assessment Entered On:  12/2/2020 12:38 PM CST    Performed On:  12/2/2020 12:38 PM CST by Gertrude Bennett CMA               Assessment   Have you ever felt you should cut down on your drinking :   No   Have people annoyed you by criticizing your drinking :   No   Have you ever felt bad or guilty about your drinking :   No   Have you ever taken a drink first thing in the morning to steady your nerves or get rid of a hangover (Eye-opener) :   No   CAGE Score :   0    Gertrude Bennett CMA - 12/2/2020 12:38 PM CST

## 2022-02-15 NOTE — PROGRESS NOTES
Patient:   JANKI QUINTERO            MRN: [id number]            FIN: [id number]               Age:   62 years     Sex:  Female     :  1955   Associated Diagnoses:   Essential hypertension; Microcytic anemia; History of breast cancer; Lymphedema of arm   Author:   Messi Park MD      Visit Information      Date of Service: 2017 01:14 pm  Performing Location: [facility]  Encounter#: [id number]      Primary Care Provider (PCP):  Messi Park MD    NPI# [id number]      Referring Provider:  No referring provider recorded for selected visit.      Chief Complaint   2017 1:21 PM CDT    swelling in right hand x 3wks, had lymph nodes removed  and will get lymphedema on/off.  Did do some recent painting 3wks ago              Additional Information:No additional information recorded during visit.   Chief complaint and symptoms as noted above and confirmed with patient.  Recent lab and diagnostic studies reviewed with patient      History of Present Illness   2016: Janki presents to clinic to establish care.  My only contact with her was when caring for her  in the setting of him being diagnosed with advanced stage neuroendocrine tumor.  They both would like to transfer the cares here locally to me.  She is remote history of early stage breast cancer diagnosed in .  Her sentinel node biopsy was positive.  She underwent right sided axillary dissection.  Also underwent adjuvant radiation therapy and chemotherapy.  She was maintained on Arimidex for 4-1/2 years.  Otherwise feels very good.  No other specific health complaints.      2017: Presents to clinic with 3 week history of persistent edema and dorsal aspect of right hand and wrist.  She is remote history of a right-sided axillary node dissection at time of original diagnosis of breast cancer.  She has had intermittent lymphedema the typically only last for a few days at a time.  Current symptoms to describe more as an  annoyance.  She did contact her breast cancer clinic in [place] who recommended evaluation by a physician to rule out any other causes of localized swelling.         Review of Systems   Constitutional:  No fever, No chills.    Eye:  Negative except as documented in history of present illness.    Ear/Nose/Mouth/Throat:  Negative except as documented in history of present illness.    Respiratory:  No shortness of breath.    Cardiovascular:  Peripheral edema, No chest pain, No palpitations, No syncope.    Gastrointestinal:  No nausea, No vomiting, No abdominal pain.    Genitourinary:  No dysuria, No hematuria.    Hematology/Lymphatics:  Negative except as documented in history of present illness.    Endocrine:  No excessive thirst, No polyuria.    Immunologic:  No recurrent fevers.    Musculoskeletal:  No joint pain, No muscle pain.    Neurologic:  Alert and oriented X4, No numbness, No tingling, No headache.       Health Status   Allergies:    Allergic Reactions (Selected)  No Known Medication Allergies   Medications:  (Selected)   Prescriptions  Prescribed  lisinopril 10 mg oral tablet: 1 tab(s) ( 10 mg ), PO, Daily, # 90 tab(s), 3 Refill(s), Type: Maintenance  Documented Medications  Documented  Vitamin B Complex with C and Folic Acid oral capsule: 1 cap(s), po, daily, 0 Refill(s), Type: Maintenance  Vitamin D3 1000 intl units oral tablet: 2 tab(s) ( 2,000 International Unit ), po, daily, 0 Refill(s), Type: Maintenance  aspirin 81 mg oral tablet: 1 tab(s) ( 81 mg ), po, daily, 0 Refill(s), Type: Maintenance   Problem list:    All Problems  Sarcoidosis / SNOMED CT 9430541048 / Confirmed  Essential hypertension / SNOMED CT 55804350 / Confirmed  Hilar adenopathy / SNOMED CT 432399445 / Confirmed  History of breast cancer / SNOMED CT 0765610965 / Confirmed  Menopausal state / SNOMED CT 306746232 / Confirmed  Resolved: Pregnancy / SNOMED CT 760893792  Resolved: Pregnancy / SNOMED CT 628197821  Resolved: Pregnancy / SNOMED  CT 306440412      Histories   Past Medical History:    Active  History of breast cancer (6731353946): Onset in the month of 2011 at 56 years  Comments:  2016 CST 5:03 PM CST - Giulia Burroughs  Stage t1c, n1  ER-positive; s/p XRT; s/p chemo  Menopausal state (009287733): Onset in  at 52 years.  Essential hypertension (93790779)  Hilar adenopathy (205581894)  Sarcoidosis (4191033166)  Resolved  Pregnancy (625193614):  Resolved in  at 21 years.  Pregnancy (977679707):  Resolved in  at 24 years.  Pregnancy (732513905):  Resolved in  at 27 years.   Family History:    Thyroid  Daughter (Mary)  CVA - Cerebrovascular accident  Father ()  CAD - Coronary artery disease  Father ()  Leukemia  Grandmother (P)  Breast Cancer  Mother (): onset at 71 .  Comments:  2016 9:16 AM - Gertrude Sevilla  Stage 4 at the time of the diagnosis.  Grandmother (P): onset at 89 .     Procedure history:    Screening for malignant neoplasm of colon (1272392130) on 2016 at 61 Years.  Comments:  2017 8:14 AM - Nicolasa Drew MA result--negative  Recommendation:   f/u 3yrs  Breast cancer (185695859) in the month of 3/2011 at 55 Years.  Comments:  2016 9:30 Gertrude Norman  One positive sentinel node.    2016 9:13 Gertrude Norman  Right invasive ductal.  Wide local excision.  Tubal ligation (834285149) on 1997 at 42 Years.  Tonsillectomy (671019919).  Childbirth (1788598443).  Comments:  2016 9:25 MAURO - Gertrude Sevilla  x 3   Social History:        Alcohol Assessment                     Comments:                      2016 - Lisa Lee.      Tobacco Assessment            Never smoker      Substance Abuse Assessment: Denies Substance Abuse      Employment and Education Assessment            Retired, Work/School description: .  Previous employment/school: ..      Home and Environment Assessment             Marital status: .      Exercise and Physical Activity Assessment: Regular exercise            Exercise frequency: Daily.  Exercise type: Walking.        Physical Examination   vital signs stable, as noted above   Vital Signs   4/18/2017 1:21 PM CDT Temperature Tympanic 97.3 DegF  LOW    Peripheral Pulse Rate 64 bpm    Systolic Blood Pressure 140 mmHg    Diastolic Blood Pressure 72 mmHg    Mean Arterial Pressure 95 mmHg    BP Site Left arm      Measurements from flowsheet : Measurements   4/18/2017 1:21 PM CDT    Ht/Wt Measurement Refused by Patient?     Yes     General:  Alert and oriented, No acute distress.    Eye:  Extraocular movements are intact.    HENT:  Normocephalic.    Neck:  Supple, No jugular venous distention.    Respiratory:  Lungs are clear to auscultation, Respirations are non-labored.    Cardiovascular:  Normal rate, Regular rhythm, No murmur.    Gastrointestinal:  Soft.    Lymphatics:  No lymphadenopathy neck, axilla, groin, Isolated soft tissue swelling along dorsal aspect of right hand; nontender.  No erythema.    Musculoskeletal:  Normal range of motion, Normal strength, No tenderness.    Neurologic:  Alert, Oriented, Normal motor function, No focal deficits.    Cognition and Speech:  Oriented, Speech clear and coherent.    Psychiatric:  Appropriate mood & affect.       Impression and Plan   Diagnosis     Essential hypertension (RYI07-VZ I10).     Microcytic anemia (OUL09-HH D50.9).     History of breast cancer (TPR35-DF Z85.3).     Lymphedema of arm (QUS47-OX I89.0).       .) unilateral right arm lymphedema - unclear why occuring now; potentially some relationship to recent painting activity   - general reassurance; no signs of DVT, cellulitis, or localized inflammation   - use of gauntlet; try and keep arm elevated   - if not resolving and becoming more bothersome, can refer to PT/lymphedema clinic    plan as previously outlined:     .) h/o stage I breast Ca, '11; + sentinel node;  negative right axillary LN dissection; high Oncoscore   - treated with XRT and chemo   - completed 4.5 yrs of Arimidex; stopped due to polyarthralgia    .) hypertension, controlled   - lisinopril 10mg daily    .) health maintenance   - CONNIE, '14 - normal   - no longer needing PAP   - annual mammography   - arrange for Cologuard   - will need Prevnar in the future    RTC as previously scheduled

## 2022-02-15 NOTE — TELEPHONE ENCOUNTER
---------------------  From: Melanie Jameson RN (Phone Messages Pool ([id number]_[place] [facility]))   To: Appointment Pool ([id number]);     Sent: 3/5/2021 1:38:06 PM CST  Subject: FW: I have received my covid-19 vaccine     Please update on pt list.    ---------------------  From: PATRICIO PIERSON on behalf of JANKI PIERSON  To: [facility]  Sent: 03/05/2021 01:26 p.m. CST  Subject: I have received my covid-19 vaccine  I want to let you know that I have received two doses of the Moderna Covid-19 vaccine (Feb 5, 2021 and March 5, 2021) through the [facility].  This information can be added to my records and my name can be removed from any lists or plans that include me.    Janki Piersonupdated list

## 2022-02-15 NOTE — TELEPHONE ENCOUNTER
---------------------  From: Jigna Gonzalez CMA (Phone Messages Pool ([id number]_[facility]))   To: JANKI QUINTERO    Sent: 1/25/2021 8:57:52 AM CST  Subject: FW: COVID 19 Vaccine for over 65 year old       We are currently waiting to hear from the state as to when we will be receiving our shipment of the vaccine. We're hoping in the next few weeks. The best way to stay notified is to check our website (link posted below) or you can call [phone] to get clinic updates.     [email or web address]    You can also check with local pharmacies to see if they know when vaccine will be available to them.    Hope this information helps!    ---------------------  From: PATRICIO QUINTERO on behalf of JANKI QUINTERO  To: [facility]  Sent: 01/23/2021 05:34 p.m. CST  Subject: COVID 19 Vaccine for over 65 year old  Do you have a plan for over 65 year olds to schedule a COVID vaccine?

## 2022-02-15 NOTE — PROGRESS NOTES
Patient:   JANKI QUINTERO            MRN: [id number]            FIN: [id number]               Age:   65 years     Sex:  Female     :  1955   Associated Diagnoses:   Cystitis, acute; Fever   Author:   Messi Park MD      Visit Information      Date of Service: 2020 01:30 pm  Performing Location: [facility]  Encounter#: [id number]      Primary Care Provider (PCP):  Messi Park MD    NPI# [id number]      Referring Provider:  Messi Park MD    NPI# [id number]   Visit type:  Telephone Encounter.    Source of history:  Patient.    Location of patient:  Home  Call Start Time:   0  Call End Time:    143      Chief Complaint   2020 1:52 PM CDT    Yest omer started feeling tired and at 930p had temp of 102, woke during the night 1230a just under 102 and 0400 100.  No other sx - no SOB, good appetite, is tired.  c/o urgency/freq w/ urination yest - seems fine today.  temp now 9739 and 98.1 (temporal)     _      History of Present Illness   Today's visit was conducted via telephone due to the COVID-19 pandemic. Patient's consent to telephone visit was obtained and documented.      Reason for visit:    I spoke with Janki via telephone for evaluation of 1 day history of fever.  She states last night she had a sudden onset of chills with a noted temperature of 102.  Does describe some increased urinary frequency yesterday with voiding approximately every hour to hour and a half.  No dysuria.  No described abdominal pain.  No upper respiratory tract symptoms.  She has been appropriately socially isolating and denies any high risk exposures.         Review of Systems   Constitutional:  Fever, Chills.    Respiratory:  No shortness of breath, No cough.    Cardiovascular:  No chest pain.    Gastrointestinal:  No nausea, No vomiting, No diarrhea.    Genitourinary:  Change in urine stream, No dysuria.       Impression and Plan   Diagnosis     Cystitis, acute (QGJ56-KV N30.00).     Fever (SAN49-AB  R50.9).        Health Status   Allergies:    Allergic Reactions (Selected)  No Known Medication Allergies   Medications:  (Selected)   Prescriptions  Prescribed  Cipro 500 mg oral tablet: = 1 tab(s) ( 500 mg ), Oral, q12 hrs, x 7 day(s), # 14 tab(s), 0 Refill(s), Type: Acute, Pharmacy: [facility] #[id number], 1 tab(s) Oral q12 hrs,x7 day(s), 62.25, in, 11/01/19 10:24:00 CDT, Height Measured, 158.4, lb, 11/01/19 10:24:00 CDT, Weigh...  lisinopril 10 mg oral tablet: = 1 tab(s), Oral, daily, # 90 tab(s), 3 Refill(s), Type: Maintenance, Pharmacy: [facility] #[id number], due for appt, 1 tab(s) Oral daily  Documented Medications  Documented  Vitamin B Complex with C and Folic Acid oral capsule: 1 cap(s), po, daily, 0 Refill(s), Type: Maintenance  Vitamin D3 1000 intl units oral tablet: 2 tab(s) ( 2,000 International Unit ), po, daily, 0 Refill(s), Type: Maintenance  aspirin 81 mg oral tablet: 1 tab(s) ( 81 mg ), po, daily, 0 Refill(s), Type: Maintenance  vitamin E 400 intl units oral capsule: 1 cap(s) ( 400 International Unit ), po, daily, 0 Refill(s), Type: Maintenance,    Medications          *denotes recorded medication          *aspirin 81 mg oral tablet: 81 mg, 1 tab(s), po, daily, 0 Refill(s).          *Vitamin D3 1000 intl units oral tablet: 2,000 International Unit, 2 tab(s), po, daily, 0 Refill(s).          Cipro 500 mg oral tablet: 500 mg, 1 tab(s), Oral, q12 hrs, for 7 day(s), 14 tab(s), 0 Refill(s).          lisinopril 10 mg oral tablet: 1 tab(s), Oral, daily, 90 tab(s), 3 Refill(s).          *Vitamin B Complex with C and Folic Acid oral capsule: 1 cap(s), po, daily, 0 Refill(s).          *vitamin E 400 intl units oral capsule: 400 International Unit, 1 cap(s), po, daily, 0 Refill(s).       Problem list:    All Problems  Sarcoidosis / SNOMED CT 3027466066 / Confirmed  Essential hypertension / SNOMED CT 15238956 / Confirmed  Hilar adenopathy / SNOMED CT 058531744 / Confirmed  History of breast cancer  / SNOMED CT 0007729678 / Confirmed  Menopausal state / SNOMED CT 967404358 / Confirmed      Histories   Past Medical History:    Active  History of breast cancer (6609816122): Onset in the month of 2011 at 56 years  Comments:  2016 CST 5:03 PM YOVANI Burroughs Giulia  Stage t1c, n1  ER-positive; s/p XRT; s/p chemo  Menopausal state (418848746): Onset in  at 52 years.  Essential hypertension (92507034)  Hilar adenopathy (943186689)  Sarcoidosis (2691091916)  Resolved  Pregnancy (174759541):  Resolved in  at 21 years.  Pregnancy (204189873):  Resolved in  at 24 years.  Pregnancy (352002117):  Resolved in  at 27 years.   Family History:    Thyroid  Daughter (Mary)  Anemia  Great Grandmother (M) (Melany Lynn, )  CVA - Cerebrovascular accident  Father ()  CAD - Coronary artery disease  Father ()  Breast Cancer  Mother (): onset at 71 .  Comments:  2016 9:16 AM Gertrude More  Stage 4 at the time of the diagnosis.  Grandmother (P) (Ariela Barbosa, ): onset at 89 .     Procedure history:    Screening for malignant neoplasm of colon (7844878844) on 2020 at 64 Years.  Comments:  2020 8:49 AM Megha Jaramillo - Negative  Date of last mammogram (2045970908) on 3/8/2018 at 62 Years.  Screening for malignant neoplasm of colon (8639585627) on 2016 at 61 Years.  Comments:  2017 8:14 AM Nicolasa Spears MA result--negative  Recommendation:   f/u 3yrs  Breast cancer (267746541) in the month of 3/2011 at 55 Years.  Comments:  2016 9:30 AM Gertrude More  One positive sentinel node.    2016 9:13 AM Gertrude More  Right invasive ductal.  Wide local excision.  Tubal ligation (347641639) on 1997 at 42 Years.  Tonsillectomy (544676067).  Childbirth (5655447618).  Comments:  2016 9:25 AM Gertrude More 3   Social History:        Alcohol Assessment            Current, Wine (5 oz),  1-2 times per month, 1 drinks/episode average.  Ready to change: No.      Tobacco Assessment            Never (less than 100 in lifetime)      Substance Abuse Assessment            Never      Employment and Education Assessment            Retired, Work/School description: .  Previous employment/school: ..      Home and Environment Assessment             Marital status:Tirso Chen Spouse/Partner name:.      Nutrition and Health Assessment            Type of diet: Regular.  Wants to lose weight: Yes.  Sleeping concerns: No.  Feels highly stressed: Yes.      Exercise and Physical Activity Assessment            Exercise frequency: Daily.  Exercise type: Walking, Yoga.      Sexual Assessment            Sexually active: Yes.  Sexual orientation: Straight or heterosexual.  Contraceptive Use Details: None.        Review / Management     .) acute febrile illness  - no URI symptoms  - no high risk or known COVID19 exposures  - does describe increased urinary frequency   - subsequent UA obtained in clinic showing significant pyuria   - will treat as presumptive pyelo/complicated UTI   - cipro 500mg BID x 1 week  - no COVID NP processed given suspected source of fever

## 2022-02-15 NOTE — PROGRESS NOTES
Patient:   JANKI QUINTERO            MRN: [id number]            FIN: [id number]               Age:   64 years     Sex:  Female     :  1955   Associated Diagnoses:   None   Author:   Messi Park MD      Visit Information      Date of Service: 2019 10:14 am  Performing Location: [facility]  Encounter#: [id number]      Primary Care Provider (PCP):  Messi Park MD    NPI# [id number]   Visit type:  Annual exam.       Chief Complaint   2019 10:17 AM CDT   AWV     Medicare Initial / Annual Preventative Visit     HPI:    2019: Rosalina returns to clinic for annual wellness evaluation.  Majority of her time spent discussing details health.  He stopped taking his experimental trial medication approximately 2 weeks ago.  Due for follow-up via [place] in early November.  There is strong anticipation of transitioning to palliative hospice based care.  Sounds like she and daily very accepting of this direction.  No significant changes in her overall health.  Feeling well.         Well Adult History     ADL's and Safety were reviewed.  None found.  Please see copy of scanned form.    I have reviewed with the patient the completed health risk assessment and health history form and we have agreed on the following plans for identified risk factors. None found.  Please see copy of scanned form.    No hearing impairment, No problems with Activities of Daily Living, Not at risk for falls and Home is safe.  Patient completed  Health Risk Assessment form and Patient Health History form were reviewed with the patient and any risks identified and plans to deal with these risks are identified in the Assessment and Plan below.  Other Health Care Providers are as currently listed in the Medical Record as a Quick Note for eye care, other medical specialists, health agencies and pharmacy and medical suppliers          Well Adult History             The patient presents for well adult exam.  I've reviewed and agree  with above chief complaint and comments   .              Review of Systems   Eye:  See Preventative Services Checklist for Vision/Glaucoma Test dates..    Ear/Nose/Mouth/Throat:  Hearing is addressed and no impairment noted..    Respiratory:  No shortness of breath.    Cardiovascular:  No chest pain.    Gastrointestinal:  No nausea, No vomiting, No diarrhea.    Genitourinary:  No dysuria.    Musculoskeletal:  No neck pain, No joint pain.    Neurologic:  Alert and oriented X4.    Psychiatric:  GDS Noted  the GDS and the CAGE assessments were reviewed and discussed with the patient.    See completed Health History for Review of Systems.      Health Status   Allergies:    Allergic Reactions (Selected)  No Known Medication Allergies   Medications:  (Selected)   Prescriptions  Prescribed  lisinopril 10 mg oral tablet: = 1 tab(s), Oral, daily, # 30 tab(s), 0 Refill(s), Type: Maintenance, Pharmacy: [facility] #[id number], due for appt  Documented Medications  Documented  Vitamin B Complex with C and Folic Acid oral capsule: 1 cap(s), po, daily, 0 Refill(s), Type: Maintenance  Vitamin D3 1000 intl units oral tablet: 2 tab(s) ( 2,000 International Unit ), po, daily, 0 Refill(s), Type: Maintenance  aspirin 81 mg oral tablet: 1 tab(s) ( 81 mg ), po, daily, 0 Refill(s), Type: Maintenance  vitamin E 400 intl units oral capsule: 1 cap(s) ( 400 International Unit ), po, daily, 0 Refill(s), Type: Maintenance   Problem list:    All Problems  Sarcoidosis / SNOMED CT 6021426185 / Confirmed  Essential hypertension / SNOMED CT 63742596 / Confirmed  Hilar adenopathy / SNOMED CT 457960947 / Confirmed  History of breast cancer / SNOMED CT 6030440067 / Confirmed  Menopausal state / SNOMED CT 915513890 / Confirmed  Resolved: Pregnancy / SNOMED CT 797676568  Resolved: Pregnancy / SNOMED CT 148151625  Resolved: Pregnancy / SNOMED CT 068189650     Current Providers and Suppliers Noted in Demographic Area.      Histories   Past Medical  History:    Active  History of breast cancer (6323727455): Onset in the month of 2011 at 56 years  Comments:  2016 CST 5:03 PM Giulia Main  Stage t1c, n1  ER-positive; s/p XRT; s/p chemo  Menopausal state (514541409): Onset in  at 52 years.  Essential hypertension (12191838)  Hilar adenopathy (156478621)  Sarcoidosis (1679512472)  Resolved  Pregnancy (226755199):  Resolved in  at 21 years.  Pregnancy (294991131):  Resolved in  at 24 years.  Pregnancy (698901856):  Resolved in  at 27 years.   Family History:    Thyroid  Daughter (Mary)  Anemia  Great Grandmother (M) (Melany Lynn, )  CVA - Cerebrovascular accident  Father ()  CAD - Coronary artery disease  Father ()  Breast Cancer  Mother (): onset at 71 .  Comments:  2016 9:16 AM Gertrude More  Stage 4 at the time of the diagnosis.  Grandmother (P) (Ariela Barbosa, ): onset at 89 .     Procedure history:    Date of last mammogram (5592339352) on 3/8/2018 at 62 Years.  Screening for malignant neoplasm of colon (8109154764) on 2016 at 61 Years.  Comments:  2017 8:14 AM Nicolasa Spears MA  [facility] result--negative  Recommendation:   f/u 3yrs  Breast cancer (497334657) in the month of 3/2011 at 55 Years.  Comments:  2016 9:30 AM Gertrude More  One positive sentinel node.    2016 9:13 AM Gertrude More  Right invasive ductal.  Wide local excision.  Tubal ligation (481835340) on 1997 at 42 Years.  Tonsillectomy (687718266).  Childbirth (5871865689).  Comments:  2016 9:25 AM Gertrude More  x 3   Social History:        Alcohol Assessment            Current, Wine (5 oz), 1 drinks/episode average.      Tobacco Assessment            Never smoker      Substance Abuse Assessment            Never      Employment and Education Assessment            Retired, Work/School description: .  Previous employment/school: ..      Home  and Environment Assessment             Marital status:Tirso Chen Spouse/Partner name:.      Nutrition and Health Assessment            Type of diet: Regular.      Exercise and Physical Activity Assessment            Exercise frequency: Daily.  Exercise type: Walking, Yoga.      Sexual Assessment            Sexually active: Yes.  Sexual orientation: Straight or heterosexual.  Contraceptive Use Details: None.        Physical Examination   Exam at Provider Discretion   Vital Signs   11/1/2019 10:17 AM CDT Temperature Tympanic 97 DegF  LOW    Peripheral Pulse Rate 60 bpm    Systolic Blood Pressure 136 mmHg  HI    Diastolic Blood Pressure 78 mmHg    Mean Arterial Pressure 97 mmHg    BP Site Right arm      Eye:  Extraocular movements are intact.    HENT:  Normocephalic, Oral mucosa is moist.    Neck:  Supple.    Respiratory:  Lungs are clear to auscultation, Respirations are non-labored.    Cardiovascular:  Normal rate, Regular rhythm, No edema.    Gastrointestinal:  Soft, Non-tender, Non-distended, Normal bowel sounds.    Lymphatics:  No lymphadenopathy neck, axilla, groin.    Musculoskeletal:  Normal range of motion.    Neurologic:  Alert, Oriented, Normal motor function, No focal deficits, No signs of cognitive impairment..    Psychiatric:  Appropriate mood & affect.       Review / Management   Results review:  INCLUDE PHQ 9.       Impression and Plan   Diagnosis     Medicare Initial / Annual Wellness Visit.     Course:  Progressing as expected.    Plan:  Please list plan for positive findings, treatment options, risk vs. benefits..    Patient Instructions:       Counseled: Patient, Discussed preventative services including  Screening for AAA (Family history or male 65-70 who has smoked more than 100 cigarettes in a lifetime.), Lipid screening, Diabetes screening, Nutrition, Bone mass, Cancer screening (cervical, breast, colon), Immunizations (flu, Pneumovax, Hep. B, Zostavax), Glaucoma screening and ECG if needed.   Appropriate weight and diet discussed.  Discussed Advance Life Directives.    Preventative services checklist reviewed, updated and copy given to patient.  Please see scanned document.       .    .) h/o stage I breast Ca, '11; + sentinel node; negative right axillary LN dissection; high Onco-score   - treated with XRT and chemo   - completed 4.5 yrs of Arimidex; stopped due to polyarthralgia   - maintains surveillance through [facility] for mammography    .) hypertension, controlled   - lisinopril 10mg daily    .) health maintenance   - DEXA, '14 - normal    - arrange for repeat DEXA   - PHQ9: normal   - no longer needing PAP   - normal Cologuard (12/2016); repeat in near future   - adult vaccinations updated   - expected grieving with deterioration of Gustavo's health    RTC annually

## 2022-02-15 NOTE — TELEPHONE ENCOUNTER
---------------------  From: Gertrude Bennett CMA ([employer] Message Pool ([id number]_[facility]))   To: JANKI QUINTERO W    Sent: 11/4/2020 11:14:49 AM CST  Subject: RE: FW: Annual Checkup with Dr. Messi Park and info for my file     Sounds good Janki, we will also have them check your BP when you come in for your labs    Gertrude      ---------------------  From: PATRICIO QUINTERO on behalf of JANKI QUINTERO  To: [employer] Message Pool ([id number]_[facility])  Sent: 11/04/2020 10:40 a.m. CST  Subject: RE: FW: Annual Checkup with Dr. Messi Park and info for my file  I will stick with video for now. I monitor my blood pressure at home and it is fine. My jeans still fit so I guess my weight is ok. If something comes up in the video that needs to be looked at then we can schedule a follow up in person. I have learned to incorporate unexpected medical costs if need be and can roll with things.       Addendum by Gertrude Bennett CMA on November 04, 2020 10:07:45 AM CST  ---------------------  From: Gertrude Bennett CMA ([employer] Message Pool ([id number]_[facility]))  To: Messi Park MD; JANKI QUINTERO  Sent: 11/4/2020 10:07:45 AM CST  Subject: FW: Annual Checkup with Dr. Messi Park and info for my file       Sony Batista,  Please see Dr Park s message  Let us know if you have any further questions.       Gertrude       Addendum by Messi Park MD on November 04, 2020 10:03:43 AM CST  ---------------------  From: Messi Park MD  To: [employer] Message Pool ([id number]_[facility]);  Sent: 11/4/2020 10:03:43 AM CST  Subject: RE: Annual Checkup with Dr. Messi Park and info for my file       I m indifferent.  I m confident we can manage remotely unless something physically Janki wants to show me.  Given significant escalation in community case rates, I want patients comfortable with whatever medium of follow-up cares.  ---------------------  From: Gertrude Bennett CMA ([facility] Message Pool (32224_[facility]))  To: Messi Park MD;  Sent: 11/4/2020 9:46:19 AM CST  Subject:  FW: Annual Checkup with Dr. Messi Park and info for my file                      ---------------------  From: PATRICIO QUINTERO on behalf of JANKI QUINTERO  To: [employer] Message Pool (32224_[facility])  Sent: 11/04/2020 09:21 a.m. CST  Subject: RE: Annual Checkup with Dr. Messi Park and info for my file  I have scheduled my blood work for November 9 and made an appointment for a video visit for Dec 2.  But I have no preference of video over in person.  While I take very few risks and don t do indoor activities, my guess is that the clinic is much safer than the grocery store.  Let me know if the doctor thinks an in person is needed and I will change the video visit.       Addendum by Gertrude Bennett CMA on October 16, 2020 1:34:34 PM CDT  ---------------------  From: Gertrude Bennett CMA ([employer] Message Pool ([id number]_[facility]))  To: JANKI QUINTERO  Sent: 10/16/2020 1:34:34 PM CDT  Subject: RE: Annual Checkup with Dr. Messi Park and info for my file       Sony MorrisonJanki,  We have you due for fasting labs prior to your visit (anytime after 11/1).   We can do your Annual Wellness visit via video, if you choose.  If we do it as a video visit, we would send you out all the required paperwork first,  and then once we get it back in clinic we can set you up for your Annual Wellness visit.  Let us know which way you prefer to schedule.       Thank you for the updates and attachments, we will update your chart.       Stay wellGertrude       Addendum by Tia Romero CMA on October 16, 2020 12:24:59 PM CDT  ---------------------  From: Tia Romero CMA (Phone Messages Pool (13724_[facility]))  To: [employer] Message Pool (12724_[facility]);  Sent: 10/16/2020 12:24:59 PM CDT  Subject: FW: Annual Checkup with Dr. Messi Park and info for my file  ---------------------  From: PATRICIO QUINTERO on behalf of JANKI QUINTERO  To: [facility]  Sent: 10/16/2020 11:53 a.m. CDT  Subject: Annual Checkup with Dr. Messi Park and info  for my file  I am due for an annual appointment in early November and want to know how Dr. Park is handling these.  Do we do blood work and video chat or does he want an office visit?  Also I have information to add to my file.  I had my annual (over 65) flue shot on Sept 9, 2020.  I had a Mammogram and Magnetic Breast Image both done at [facility] on Sept. 16, 2020.  Both had negative results and I have attached the doctor s reports.  If actual images are needed then they can be accessed through [employer] or I can have them sent up from [place].  Lynne Pierson

## 2022-02-15 NOTE — NURSING NOTE
Comprehensive Intake Entered On:  5/22/2020 1:55 PM CDT    Performed On:  5/22/2020 1:52 PM CDT by Gertrude Bennett CMA               Summary   Chief Complaint :   Yest omer started feeling tired and at 930p had temp of 102, woke during the night 1230a just under 102 and 0400 100.  No other sx - no SOB, good appetite, is tired.  c/o urgency/freq w/ urination yest - seems fine today.  temp now 9739 and 98.1 (temporal)   Advance Directive :   Yes   Additonal Information :   noted some low back pain yest, fine today - agrees to telemed visit   Sabrina HERNANDEZ Gertrude - 5/22/2020 1:52 PM CDT   Health Status   Allergies Verified? :   Yes   Medication History Verified? :   Yes   Medical History Verified? :   Yes   Pre-Visit Planning Status :   Completed   Tobacco Use? :   Never smoker   Sabrina HERNANDEZ Gertrude - 5/22/2020 1:52 PM CDT   ID Risk Screen   Recent Travel History :   No recent travel   Family Member Travel History :   No recent travel   Other Exposure to Infectious Disease :   Unknown   Gertrude Bennett CMA - 5/22/2020 1:52 PM CDT   Social History   Social History   (As Of: 5/22/2020 1:55:13 PM CDT)   Alcohol:        Current, Wine (5 oz), 1-2 times per month, 1 drinks/episode average.  Ready to change: No.   (Last Updated: 11/1/2019 4:23:12 PM CDT by Valentina Georges)          Tobacco:        Never (less than 100 in lifetime)   (Last Updated: 11/1/2019 4:22:55 PM CDT by Valentina Georges)          Substance Abuse:        Never   (Last Updated: 10/19/2017 10:27:17 AM CDT by Chantel Bravo)          Employment/School:        Retired, Work/School description: .  Previous employment/school: ..   (Last Updated: 12/30/2016 1:42:23 PM CST by Lisa Lee)          Home/Environment:         Marital status:Tirso Chen Spouse/Partner name:.   (Last Updated: 1/3/2019 8:18:33 PM UTC by Lena Beard CMA)          Nutrition/Health:        Type of diet: Regular.  Wants to lose weight: Yes.  Sleeping concerns: No.   Feels highly stressed: Yes.   (Last Updated: 11/1/2019 4:23:42 PM CDT by Valentina Georges)          Exercise:        Exercise frequency: Daily.  Exercise type: Walking, Yoga.   (Last Updated: 10/19/2017 10:27:33 AM CDT by Chantel Bravo)          Sexual:        Sexually active: Yes.  Sexual orientation: Straight or heterosexual.  Contraceptive Use Details: None.   (Last Updated: 10/19/2017 10:27:40 AM CDT by Chantel Bravo)

## 2022-02-15 NOTE — TELEPHONE ENCOUNTER
---------------------From: PATRICIO QUINTERO on behalf of JANKI QUINTEROTo: [facility] CenterSent: 10/16/2020 11:53 a.m. CDTSubject: Annual Checkup with Dr. Messi Park and info for my fileI am due for an annual appointment in early November and want to know how Dr. Park is handling these.  Do we do blood work and video chat or does he want an office visit?Also I have information to add to my file.I had my annual (over 65) flue shot on Sept 9, 2020.I had a Mammogram and Magnetic Breast Image both done at [facility] on Sept. 16, 2020.  Both had negative results and I have attached the doctor's reports.  If actual images are needed then they can be accessed through [employer] or I can have them sent up from [place].Janki Quintero---------------------From: Tia Romero CMA (Phone Messages Pool ([id number]_[facility])) To: [employer] Message Pool ([id number]_[facility]);   Sent: 10/16/2020 12:24:59 PM CDTSubject: FW: Annual Checkup with Dr. Messi Park and info for my file---------------------From: Gertrude Bennett CMA ([employer] Message Pool ([id number]_[facility])) To: JANKI QUINTERO  Sent: 10/16/2020 1:34:34 PM CDTSubject: RE: Annual Checkup with Dr. Messi Park and info for my file Sony Batista,We have you due for fasting labs prior to your visit (anytime after 11/1).   We can do your Annual Wellness visit via video, if you choose.  If we do it as a video visit, we would send you out all the required paperwork first,  and then once we get it back in clinic we can set you up for your Annual Wellness visit.  Let us know which way you prefer to schedule.Thank you for the updates and attachments, we will update your chart.Stay well,Gertrude---------------------From: Gertrude Bennett CMA ([employer] Message Pool ([id number]_[facility])) To: Becky Avila;   Sent: 10/16/2020 2:16:25 PM CDTSubject: FW: Annual Checkup with Dr. Messi Park and info for my file see attached documents---------------------From: Becky Avila To: "[email or web address] Pool  ([id number]_[facility]);   Sent: 10/16/2020 3:01:40 PM CDTSubject: RE: Annual Checkup with Dr. Messi Park and info for my file Records printed and sent to [place] to be scanned. thanks

## 2022-02-15 NOTE — LETTER
(Inserted Image. Unable to display)   November 01, 2019      JANKI QUINTERO  [id number] [place], [place] [id number]                  Result Name Current Result Previous Result Reference Range   Lab Report   10/23/2019   10/12/2018    Cholesterol (mg/dL) ((H)) 227 10/23/2019 ((H)) 252 10/12/2018  - <200   HDL (mg/dL)  102 10/23/2019  127 10/12/2018 >50 -    Triglyceride (mg/dL)  61 10/23/2019  53 10/12/2018  - <150   LDL ((H)) 110 10/23/2019 ((H)) 111 10/12/2018    Cholesterol/HDL Ratio  2.2 10/23/2019  2.0 10/12/2018  - <5.0   Non-HDL Cholesterol  125 10/23/2019  125 10/12/2018  - <130   Glucose Level (mg/dL)  92 10/23/2019  86 10/12/2018 65 - 99   BUN (mg/dL)  9 10/23/2019  16 10/12/2018 7 - 25   Creatinine Level (mg/dL)  0.82 10/23/2019  0.77 10/12/2018 0.50 - 0.99   eGFR (mL/min/1.73m2)  75 10/23/2019  81 10/12/2018 > OR = 60 -    eGFR  (mL/min/1.73m2)  86 10/23/2019  94 10/12/2018 > OR = 60 -    BUN/Creat Ratio  NOT APPLICABLE 10/23/2019  NOT APPLICABLE 10/12/2018 6 - 22   Sodium Level (mmol/L)  141 10/23/2019  140 10/12/2018 135 - 146   Potassium Level (mmol/L)  4.3 10/23/2019  4.3 10/12/2018 3.5 - 5.3   Chloride Level (mmol/L)  106 10/23/2019  105 10/12/2018 98 - 110   CO2 Level (mmol/L)  27 10/23/2019  27 10/12/2018 20 - 32   Calcium Level (mg/dL)  9.1 10/23/2019  9.0 10/12/2018 8.6 - 10.4   Hgb (gm/dL)  13.1 10/23/2019  13.4 10/12/2018 11.7 - 15.5         Sincerely,        Messi Park MD

## 2022-02-15 NOTE — TELEPHONE ENCOUNTER
---------------------  From: Melanie Jameson RN (Phone Messages Pool ([id number]_[facility]))   To: JANKI PIERSON    Sent: 3/5/2021 1:37:44 PM CST  Subject: RE: I have received my covid-19 vaccine     This has been updated in your chart.  Thank you Janki!    Have a great weekend!    Melanie SLATER RN      ---------------------  From: PATRICIO PIERSON on behalf of JANKI PIERSON  To: [facility]  Sent: 03/05/2021 01:26 p.m. CST  Subject: I have received my covid-19 vaccine  I want to let you know that I have received two doses of the Moderna Covid-19 vaccine (Feb 5, 2021 and March 5, 2021) through the [facility].  This information can be added to my records and my name can be removed from any lists or plans that include me.    Janki Pierson

## 2022-02-15 NOTE — PROGRESS NOTES
Patient:   JANKI QUINTERO            MRN: [id number]            FIN: [id number]               Age:   63 years     Sex:  Female     :  1955   Associated Diagnoses:   Essential hypertension; Microcytic anemia; History of breast cancer; Physical exam   Author:   Messi Park MD      Visit Information      Date of Service: 10/18/2018 11:47 am  Performing Location: [facility]  Encounter#: [id number]      Primary Care Provider (PCP):  Messi Park MD    NPI# [id number]      Referring Provider:  Messi Park MD    NPI# [id number]      Chief Complaint   10/18/2018 11:58 AM CDT  Physical              Additional Information:No additional information recorded during visit.   Chief complaint and symptoms as noted above and confirmed with patient.  Recent lab and diagnostic studies reviewed with patient      History of Present Illness   10/12/2017: Presents to clinic for annual physical.  Gustavo doing well overall.  They are planning to leave for [place] early November for his second course of radioactive octreotide.  Tolerating medications.  NO issues with upper extremity lymphedema. Not sure when she will transition to Medicare.        10/18/2018: Presents to clinic for annual wellness evaluation.  Overall doing well.  Shares some specific concerns related to Gustavo s health.  She did complete her mammography with [facility] through [place].  No other specific complaints or concerns.  Has gained approximately 15 pounds of this past calendar year.         Review of Systems   Constitutional:  No fever, No chills.    Eye:  Negative except as documented in history of present illness.    Ear/Nose/Mouth/Throat:  Nasal congestion.    Respiratory:  Cough, No shortness of breath.    Cardiovascular:  No chest pain, No palpitations, No peripheral edema, No syncope.    Gastrointestinal:  No nausea, No vomiting, No abdominal pain.    Genitourinary:  No dysuria, No hematuria.    Hematology/Lymphatics:  Negative except as  documented in history of present illness.    Endocrine:  No excessive thirst, No polyuria.    Immunologic:  No recurrent fevers.    Musculoskeletal:  No joint pain, No muscle pain.    Neurologic:  Alert and oriented X4, No numbness, No tingling, No headache.       Health Status   Allergies:    Allergic Reactions (Selected)  No Known Medication Allergies   Medications:  (Selected)   Prescriptions  Prescribed  lisinopril 10 mg oral tablet: See Instructions, Instructions: TAKE ONE TABLET BY MOUTH ONCE DAILY, # 90 unknown unit, 1 Refill(s), Type: Soft Stop, Pharmacy: [facility], TAKE ONE TABLET BY MOUTH ONCE DAILY  lisinopril 10 mg oral tablet: See Instructions, Instructions: TAKE ONE TABLET BY MOUTH ONCE DAILY, # 90 unknown unit, Type: Soft Stop, Pharmacy: [facility], TAKE ONE TABLET BY MOUTH ONCE DAILY  Documented Medications  Documented  Vitamin B Complex with C and Folic Acid oral capsule: 1 cap(s), po, daily, 0 Refill(s), Type: Maintenance  Vitamin D3 1000 intl units oral tablet: 2 tab(s) ( 2,000 International Unit ), po, daily, 0 Refill(s), Type: Maintenance  aspirin 81 mg oral tablet: 1 tab(s) ( 81 mg ), po, daily, 0 Refill(s), Type: Maintenance  vitamin E 400 intl units oral capsule: 1 cap(s) ( 400 International Unit ), po, daily, 0 Refill(s), Type: Maintenance   Problem list:    All Problems  Sarcoidosis / SNOMED CT 2739311500 / Confirmed  Essential hypertension / SNOMED CT 71813665 / Confirmed  Hilar adenopathy / SNOMED CT 966146835 / Confirmed  History of breast cancer / SNOMED CT 1784453642 / Confirmed  Menopausal state / SNOMED CT 340621547 / Confirmed  Resolved: Pregnancy / SNOMED CT 548162592  Resolved: Pregnancy / SNOMED CT 429502054  Resolved: Pregnancy / SNOMED CT 043307829      Histories   Past Medical History:    Active  History of breast cancer (6468088486): Onset in the month of 4/2011 at 56 years  Comments:  11/14/2016 CST 5:03 PM YOVANI - Giulia Burroughs  Stage t1c, n1  ER-positive; s/p XRT; s/p  chemo  Menopausal state (994920286): Onset in  at 52 years.  Essential hypertension (00765748)  Hilar adenopathy (863701092)  Sarcoidosis (3400947607)  Resolved  Pregnancy (067799056):  Resolved in  at 21 years.  Pregnancy (488563914):  Resolved in  at 24 years.  Pregnancy (606620655):  Resolved in  at 27 years.   Family History:    Thyroid  Daughter (Mary)  CVA - Cerebrovascular accident  Father ()  CAD - Coronary artery disease  Father ()  Leukemia  Grandmother (P)  Breast Cancer  Mother (): onset at 71 .  Comments:  2016 9:16 Gertrude Norman  Stage 4 at the time of the diagnosis.  Grandmother (P): onset at 89 .     Procedure history:    Date of last mammogram (8451552352) on 3/8/2018 at 62 Years.  Screening for malignant neoplasm of colon (8916518969) on 2016 at 61 Years.  Comments:  2017 8:14 MAURO - Nicolasa Drew MA result--negative  Recommendation:   f/u 3yrs  Breast cancer (256912145) in the month of 3/2011 at 55 Years.  Comments:  2016 9:30 Gertrude Norman  One positive sentinel node.    2016 9:13 Gertrude Norman  Right invasive ductal.  Wide local excision.  Tubal ligation (706329846) on 1997 at 42 Years.  Tonsillectomy (905600895).  Childbirth (5317750304).  Comments:  2016 9:25 Gertrude Norman  x 3   Social History:        Alcohol Assessment            Current, Wine (5 oz), 1 drinks/episode average.      Tobacco Assessment            Never smoker      Substance Abuse Assessment            Never      Employment and Education Assessment            Retired, Work/School description: .  Previous employment/school: ..      Home and Environment Assessment            Marital status: .  Spouse/Partner name: Gustavo.  Risks in environment: Unlocked guns.      Nutrition and Health Assessment            Type of diet: Regular.      Exercise and Physical Activity Assessment            Exercise  frequency: Daily.  Exercise type: Walking, Yoga.      Sexual Assessment            Sexually active: Yes.  Sexual orientation: Straight or heterosexual.  Contraceptive Use Details: None.        Physical Examination   vital signs stable, as noted above   Vital Signs   10/18/2018 11:58 AM CDT Temperature Tympanic 98.5 DegF    Peripheral Pulse Rate 80 bpm    HR Method Manual    Systolic Blood Pressure 108 mmHg    Diastolic Blood Pressure 62 mmHg    Mean Arterial Pressure 77 mmHg    BP Site Right arm      Measurements from flowsheet : Measurements   10/18/2018 11:58 AM CDT Height Measured - Standard 62.25 in    Weight Measured - Standard 157 lb    BSA 1.77 m2    Body Mass Index 28.48 kg/m2  HI      General:  Alert and oriented, No acute distress.    Eye:  Extraocular movements are intact.    HENT:  Normocephalic, Tympanic membranes are clear, Oral mucosa is moist, No pharyngeal erythema.    Neck:  Supple, Non-tender, No thyromegaly.    Respiratory:  Lungs are clear to auscultation, Respirations are non-labored.    Cardiovascular:  Normal rate, Regular rhythm, No murmur, No edema.    Gastrointestinal:  Soft, Non-tender.    Musculoskeletal:  Normal range of motion, Normal strength, No tenderness.    Neurologic:  Alert, Oriented, Normal motor function, No focal deficits.    Cognition and Speech:  Oriented, Speech clear and coherent.    Psychiatric:  Appropriate mood & affect.       Review / Management   Results review:  Lab results   10/12/2018 8:06 AM CDT Sodium Level 140 mmol/L    Potassium Level 4.3 mmol/L    Chloride Level 105 mmol/L    CO2 Level 27 mmol/L    Glucose Level 86 mg/dL    BUN 16 mg/dL    Creatinine 0.77 mg/dL    BUN/Creat Ratio NOT APPLICABLE    eGFR 81 mL/min/1.73m2    eGFR African American 94 mL/min/1.73m2    Calcium Level 9.0 mg/dL    Cholesterol 252 mg/dL  HI    Non-     mg/dL    Chol/HDL Ratio 2.0      HI    Triglyceride 53 mg/dL    Hgb 13.4 gm/dL   .       Impression and Plan    Diagnosis     Essential hypertension (GQB20-VE I10).     Microcytic anemia (GRZ73-YN D50.9).     History of breast cancer (LLA29-VG Z85.3).     Physical exam (APW06-GX Z00.00).       .) h/o stage I breast Ca, '11; + sentinel node; negative right axillary LN dissection; high Oncoscore   - treated with XRT and chemo   - completed 4.5 yrs of Arimidex; stopped due to polyarthralgia   - maintains surveillance through [place] for mammography    .) hypertension, controlled   - lisinopril 10mg daily    .) health maintenance   - DEXA, '14 - normal   - PHQ9: normal   - no longer needing PAP   - normal Cologuard (12/2016); repeat q3 yrs   - adult vaccinations updated    RTC annually      Professional Services   Counseling Summary:  This was a 25 minute visit with greater than 50% of that time spent counseling the patient, including review of outside records.

## 2022-02-15 NOTE — TELEPHONE ENCOUNTER
---------------------From: Chelsey Tan CMA (Phone Messages Pool ([id number]_[facility])) To: [facility] Message Pool (02724_[facility]);   Sent: 11/4/2019 7:55:27 AM CSTSubject: FW: Mammogram Report to be added to my file ---------------------From: PATRICIO PIERSON on behalf of JANKI PIERSONTo: [facility] CenterSent: 11/03/2019 06:14 p.m. CSTSubject: Mammogram Report to be added to my fileI attached the radiology report for my most recent mammogram.  It was preformed at the [facility] on July 1, 2019..Janki Pierson---------------------From: Gertrude Bennett CMA ([facility] Message Pool (58024_[facility])) To: JANKI PIERSON  Sent: 11/5/2019 8:21:06 AM CSTSubject: FW: Mammogram Report to be added to my file Thanks Janki for the information.I forgot to have you sign for the Cologuard, which is due next month.  I will be mailing that out to you and just ask that you sign and return to us.  I will fax to [place] next month when you are due.Thank you!Gertrude

## 2022-02-15 NOTE — TELEPHONE ENCOUNTER
A summary of the submitted changes has been added to the patient's chart. You may review the document by clicking on the message attachment located above.Submission Details -Originating Source: ProxyOriginating Author: PATRICIO Powellbmission Date: 10/13/2018 at 05:58 PMAppointment Details - Appointment Date: 10/18/2018 at 10:00 AM Appointment Type: PHYSICAL - VHAppointment Resource: Xavier TITUS, BenAppointment Location: [facility]

## 2022-02-15 NOTE — LETTER
(Inserted Image. Unable to display)   [address] [place] [address]  September 24, 2021      JANKI QUINTERO  [id number] [address]        Dear JANKI,      Thank you for selecting [facility] for your healthcare needs.      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  Primarily described at the base of great toe.  Like we discussed in clinic, I think you are seeing some mild deformity change with the 2nd toe.  I would continue with orthotic shoewear.  If pains persist/worsening, we can have you plan to see ortho foot/ankle vs. podiatry provider.            Please contact me or my assistant at [phone] if you have any questions or concerns.     Sincerely,        Messi Park MD

## 2022-02-15 NOTE — NURSING NOTE
Vital Signs Entered On:  12/14/2021 9:23 AM CST    Performed On:  12/14/2021 9:22 AM CST by Gertrude Bennett CMA               Vital Signs   Systolic Blood Pressure :   131 mmHg (HI)    Diastolic Blood Pressure :   70 mmHg   Mean Arterial Pressure :   90 mmHg   Gertrude Bennett CMA - 12/14/2021 9:22 AM CST

## 2022-02-15 NOTE — TELEPHONE ENCOUNTER
---------------------  From: Mari aIsabel Kennedy LPN (Phone Messages Pool ([id number]_[facility]))   To: [facility] Message Pool (32224_[place] [facility] [place]);     Sent: 1/21/2019 1:28:55 PM CST  Subject: FW: Shinrix Vaccine Dose 2           ---------------------  From: PATRICIO QUINTERO on behalf of JANKI QUINTERO  To: [facility]  Sent: 01/21/2019 12:42 p.m. CST  Subject: Shinrix Vaccine Dose 2  This message is for Gertrude in Dr. Messi Park s office ([place]).    I want to let you know that Patricio (Kenna) and I were both able to get our second dose of the shinrix vaccine today at a [facility] in [place].  You can update our files and remove us from any waiting lists that we may be on.    Thanks,  Janki Quintero---------------------  From: Gertrude Bennett CMA ([employer] Message Pool ([id number]_[facility]))   To: JANKI QUINTERO    Sent: 1/22/2019 7:46:19 AM CST  Subject: RE: Shinrix Vaccine Dose 2     Thank you for letting us know.  Your files have been updated.    Gertrude

## 2022-02-15 NOTE — NURSING NOTE
Medicare Visit Entered On:  2019 10:20 AM CDT    Performed On:  2019 10:17 AM CDT by Sabrina HERNANDEZ Gertrude               Summary   Weight Measured :   158.4 lb(Converted to: 158 lb 6 oz, 71.85 kg)    Height Measured :   62.25 in(Converted to: 5 ft 2 in, 158.11 cm)    Body Mass Index :   28.74 kg/m2 (HI)    Body Surface Area :   1.77 m2   Systolic Blood Pressure :   136 mmHg (HI)    Diastolic Blood Pressure :   78 mmHg   Mean Arterial Pressure :   97 mmHg   Peripheral Pulse Rate :   60 bpm   BP Site :   Right arm   Temperature Tympanic :   97 DegF(Converted to: 36.1 DegC)  (LOW)    Sabrina HERNANDEZ Gertrude 2019 10:24 AM CDT   Chief Complaint :   AWV   Advance Directive :   Yes   Sabrina HERNANDEZ Gertrude 2019 10:17 AM CDT   Health Status   Allergies Verified? :   Yes   Medication History Verified? :   Yes   Medical History Verified? :   Yes   Pre-Visit Planning Status :   Completed   Tobacco Use? :   Never smoker   Sabrina HERNANDEZ Gertrude 2019 10:17 AM CDT   Demographics   Last Name :   INGRID   Address :   [address]   First Name :   JANKI Terry Initial :   NICOLE   Responsible Party Date of Birth () :   1955 CST   City :   [place]   State :   [place]   Zip Code :   [address]   Contact Relationship to Patient (other) :   Patient   Sabrina DAVID Gertrude 2019 10:17 AM CDT   Providers Grid   Provider Name :    JESSY Christianson Dr., Dr.        Provider Specialty :    [place]   dentist   optometrist          Sabrina HERNANDEZ Gertrude2019 10:17 AM CDT  Sabrina HERNANDEZ Gertrude 2019 10:17 AM CDT  Sabrina HERNANDEZ Gertrude 2019 10:17 AM CDT       Ancillary Services Grid   Name :    [facility]              Type of Service :    Other: Breast Clinic                Sabrina HERNANDEZ 2019 10:17 AM CDT         Consents   Consent for Immunization Exchange :   Consent Granted   Consent for Immunizations to Providers :   Consent Granted   Sabrina HERNANDEZ Gertrude 2019 10:17 AM CDT    Social History   Social History   (As Of: 11/1/2019 10:20:25 AM CDT)   Alcohol:        Current, Wine (5 oz), 1 drinks/episode average.   (Last Updated: 10/19/2017 10:27:10 AM CDT by Chantel Bravo)          Tobacco:        Never smoker   (Last Updated: 11/29/2016 7:23:33 AM CST by Gertrude Bennett CMA)          Substance Abuse:        Never   (Last Updated: 10/19/2017 10:27:17 AM CDT by Chantel Bravo)          Employment/School:        Retired, Work/School description: .  Previous employment/school: ..   (Last Updated: 12/30/2016 1:42:23 PM CST by Lisa Lee)          Home/Environment:         Marital status:Tirso Chen Spouse/Partner name:.   (Last Updated: 1/3/2019 8:18:33 PM UTC by Lena Beard CMA)          Nutrition/Health:        Type of diet: Regular.   (Last Updated: 10/19/2017 10:27:25 AM CDT by Chantel Bravo)          Exercise:        Exercise frequency: Daily.  Exercise type: Walking, Yoga.   (Last Updated: 10/19/2017 10:27:33 AM CDT by Chantel Bravo)          Sexual:        Sexually active: Yes.  Sexual orientation: Straight or heterosexual.  Contraceptive Use Details: None.   (Last Updated: 10/19/2017 10:27:40 AM CDT by Chantel Bravo)            Geriatric Depression Screening   Geriatric Depression Satisfied Life :   Yes   Geriatric Depression Dropped Activities :   No   Geriatric Depression Life Empty :   No   Geriatric Depression Bored :   No   Geriatric Depression Good Spirits :   Yes   Geriatric Depression Afraid Bad Things :   Yes   Geriatric Depression Feel Happy :   Yes   Geriatric Depression Feel Helpless :   No   Geriatric Depression Prefer to Stay Home :   No   Geriatric Depression Memory Problems :   No   Geriatric Depression Wonderful Be Alive :   Yes   Geriatric Depression Feel Worthless :   No   Geriatric Depression Situation Hopeless :   No   Geriatric Depression Others Better Off :   No   Geriatric Depression Full of Energy :   No   Geriatric Depression Total  Score :   2    Jaylenchema DAVID Gertrude - 11/1/2019 10:17 AM CDT   Hearing and Vision Screening   Audiogram Result Right Ear :   Pass   Audiogram Result Left Ear :   Pass   Sabrina HERNANDEZ Gertrude - 11/1/2019 10:24 AM CDT   Home Safety Screen   Emergency Numbers Kept/Updated :   Yes   Aware of Smoking Dangers :   Yes   Smoke Alarms/Fire Extinguisher Available :   Yes   Household Members Fire Safety Knowledge :   Yes   Firearms Unloaded and Secure :   Yes   Floor Rugs Removed or Fastened :   No   Mats in Bathtub/Shower :   Yes   Stairway Rails or Banisters :   Yes   Outdoor Clutter Safety :   Yes   Indoor Clutter Safety :   Yes   Electrical Cord Safety :   Yes   Gertrude Bennett CMA - 11/1/2019 10:17 AM CDT   Thang Fall Risk   History of Falls in Last 3 Months Thang :   No   Sabrina HERNANDEZ Gertrude - 11/1/2019 10:17 AM CDT   Functional Assessment   Focused Functional Assessment Grid   Bathing :   Independent (2)   Dressing :   Independent (2)   Toileting :   Independent (2)   Transferring Bed or Chair :   Independent (2)   Feeding :   Independent (2)   Sabrina HERNANDEZ Gertrude - 11/1/2019 10:17 AM CDT   Capable of Shopping :   Yes   Capable of Walking :   Yes   Capable of Housekeeping :   Yes   Capable of Managing Medications :   Yes   Capable of Handling Finances :   Yes   Gertrude Bennett CMA - 11/1/2019 10:17 AM CDT

## 2022-02-15 NOTE — TELEPHONE ENCOUNTER
---------------------  From: Severiano EM, Ladonna NEGRO (Phone Messages Pool ([id number]_[facility]))   Sent: 3/16/2020 10:16:19 AM CDT  Subject: Intestinal bug     Phone message    PCP:   MIKAYLA      Time of Call:  0933       Person Calling:  Pt  Phone number:  [phone], OK to LVM    Returned call at: 1010    Note:   Pt LVM stating she has had an intestinal bug for a while and is feeling weak, but better than she was. She would like a call to discuss.    Called pt; Started Friday night with diarrhea and vomiting, fever. Able to keep food/water down in small amounts. Discussed continuing fluids, bland foods, electrolytes, etc.     Pt expressed understanding, wanted to be sure her illness was documented in her chart. Will call with any other questions or concerns.    Last office visit and reason:  11/1/19, MANISHA

## 2022-02-15 NOTE — NURSING NOTE
Depression Screening Entered On:  12/2/2020 12:38 PM CST    Performed On:  12/2/2020 12:38 PM CST by Gertrude Bennett CMA               Depression Screening   Little Interest - Pleasure in Activities :   Several days   Feeling Down, Depressed, Hopeless :   Several days   Initial Depression Screen Score :   2 Score   Poor Appetite or Overeating :   Not at all   Trouble Falling or Staying Asleep :   Several days   Feeling Tired or Little Energy :   Not at all   Feeling Bad About Yourself :   Not at all   Trouble Concentrating :   Not at all   Moving or Speaking Slowly :   Not at all   Thoughts Better Off Dead or Hurting Self :   Not at all   SO Difficulty with Work, Home, Others :   Not difficult at all   Detailed Depression Screen Score :   1    Total Depression Screen Score :   3    Gertrude Bennett CMA - 12/2/2020 12:38 PM CST

## 2022-02-15 NOTE — NURSING NOTE
Medicare Visit Entered On:  12/14/2021 9:22 AM CST    Performed On:  12/14/2021 9:16 AM CST by Gertrude Bennett CMA               Summary   Chief Complaint :   AWV   Advance Directive :   Yes   Weight Measured :   161.8 lb(Converted to: 161 lb 13 oz, 73.391 kg)    Height Measured :   62.5 in(Converted to: 5 ft 2 in, 158.75 cm)    Body Mass Index :   29.12 kg/m2 (HI)    Body Surface Area :   1.8 m2   Systolic Blood Pressure :   138 mmHg (HI)    Diastolic Blood Pressure :   79 mmHg   Mean Arterial Pressure :   99 mmHg   Peripheral Pulse Rate :   47 bpm (LOW)    Temperature Tympanic :   97.7 DegF(Converted to: 36.5 DegC)  (LOW)    Oxygen Saturation :   97 %   Gertrude Bennett CMA - 12/14/2021 9:16 AM CST   Health Status   Allergies Verified? :   Yes   Medication History Verified? :   Yes   Medical History Verified? :   Yes   Pre-Visit Planning Status :   Completed   Tobacco Use? :   Never smoker   Gertrude Bennett CMA - 12/14/2021 9:16 AM CST   Consents   Consent for Immunization Exchange :   Consent Granted   Consent for Immunizations to Providers :   Consent Granted   Gertrude Bennett CMA - 12/14/2021 9:16 AM CST   Social History   Social History   (As Of: 12/14/2021 9:22:29 AM CST)   Alcohol:        Current, Wine (5 oz), 1-2 times per month, 1 drinks/episode average.  Ready to change: No.   (Last Updated: 11/1/2019 4:23:12 PM CDT by Valentina Georges)          Tobacco:        Never (less than 100 in lifetime)   (Last Updated: 11/1/2019 4:22:55 PM CDT by Valentina Georges)   Never (less than 100 in lifetime)   (Last Updated: 12/2/2020 10:19:31 AM CST by Gertrude Bennett CMA)   Never (less than 100 in lifetime)   (Last Updated: 12/14/2021 9:17:16 AM CST by Gertrude Bennett CMA)          Electronic Cigarette/Vaping:        Electronic Cigarette Use: Never.   (Last Updated: 12/2/2020 10:19:37 AM CST by Gertrude Bennett CMA)   Electronic Cigarette Use: Never.   (Last Updated: 12/14/2021 9:17:22 AM CST by Gertrude Bennett CMA)          Substance  Abuse:        Never   (Last Updated: 10/19/2017 10:27:17 AM CDT by Chantel Bravo)          Employment/School:        Retired, Work/School description: .  Previous employment/school: ..   (Last Updated: 12/30/2016 1:42:23 PM CST by Lisa Lee)          Home/Environment:         Marital status:Tirso Chen Spouse/Partner name:.   (Last Updated: 1/3/2019 8:18:33 PM UTC by Lena Beard CMA)          Nutrition/Health:        Type of diet: Regular.  Wants to lose weight: Yes.  Sleeping concerns: No.  Feels highly stressed: Yes.   (Last Updated: 11/1/2019 4:23:42 PM CDT by Valentina Georges)          Exercise:        Exercise frequency: Daily.  Exercise type: Walking, Yoga.   (Last Updated: 10/19/2017 10:27:33 AM CDT by Chantel Bravo)          Sexual:        Sexually active: Yes.  Sexual orientation: Straight or heterosexual.  Contraceptive Use Details: None.   (Last Updated: 10/19/2017 10:27:40 AM CDT by Chantel Bravo)            Geriatric Depression Screening   Geriatric Depression Satisfied Life :   Yes   Geriatric Depression Dropped Activities :   No   Geriatric Depression Life Empty :   No   Geriatric Depression Bored :   Yes   Geriatric Depression Good Spirits :   Yes   Geriatric Depression Afraid Bad Things :   No   Geriatric Depression Feel Happy :   No   Geriatric Depression Feel Helpless :   No   Geriatric Depression Prefer to Stay Home :   No   Geriatric Depression Memory Problems :   No   Geriatric Depression Wonderful Be Alive :   No   Geriatric Depression Feel Worthless :   No   Geriatric Depression Situation Hopeless :   No   Geriatric Depression Others Better Off :   No   Geriatric Depression Full of Energy :   No   Geriatric Depression Total Score :   4    Gertrude Bennett CMA - 12/14/2021 9:16 AM CST   Hearing and Vision Screening   Audiogram Result Right Ear :   Pass   Audiogram Result Left Ear :   Pass   Gertrude Bennett CMA - 12/14/2021 9:16 AM CST   Home Safety Screen   Emergency  Numbers Kept/Updated :   Yes   Aware of Smoking Dangers :   Yes   Smoke Alarms/Fire Extinguisher Available :   Yes   Household Members Fire Safety Knowledge :   Yes   Firearms Unloaded and Secure :   Yes   Floor Rugs Removed or Fastened :   No   Mats in Bathtub/Shower :   No   Stairway Rails or Banisters :   Yes   Outdoor Clutter Safety :   Yes   Indoor Clutter Safety :   Yes   Electrical Cord Safety :   Yes   Gertrude Bennett CMA - 12/14/2021 9:16 AM CST   Advance Directives   Advance Directive :   Yes   Advanced Directives Status :   Current and verified   Advance Directive Type :   Living will   Advance Directive Intent Stated By :   Self   Advance Directive Additional Information :   No   Gertrude Bennett CMA - 12/14/2021 9:16 AM CST   Thang Fall Risk   History of Falls in Last 3 Months Desir :   No   Benita Bennett CMAe - 12/14/2021 9:16 AM CST   Functional Assessment   Focused Functional Assessment Grid   Bathing :   Independent (2)   Dressing :   Independent (2)   Toileting :   Independent (2)   Transferring Bed or Chair :   Independent (2)   Feeding :   Independent (2)   Gertrude Bennett CMA - 12/14/2021 9:16 AM CST   Capable of Shopping :   Yes   Capable of Walking :   Yes   Capable of Housekeeping :   Yes   Capable of Managing Medications :   Yes   Capable of Handling Finances :   Yes   Gertrude Bennett CMA - 12/14/2021 9:16 AM CST

## 2022-02-15 NOTE — TELEPHONE ENCOUNTER
"---------------------  From: Linda webber LPN (Phone Messages Pool ([id number]))   To: [employer] Message Pool ([id number]);     Sent: 6/1/2020 8:14:07 AM CDT  Subject: FW: My UTI infection           ---------------------  From: PATRICIO QUINTERO on behalf of JANKI QUINTERO  To: [facility]  Sent: 05/31/2020 12:25 p.m. CDT  Subject: My UTI infection  Dear Gertrude/Dr. Park,    This is to update you on my infection.  I completed the Cipro prescription Friday morning.  My fever mahogany at night on Friday (5/22) and Saturday (5/23) but returned to normal last Sunday morning and has been normal since then.  All of the bladder issues have also resolved.  I feel good and believe everything is back to normal.    I want thank you for your response and wisdom.  I keep thinking of the phrase \"When you hear hooves, think horses not zebras\".  The last four years have been filled with \"zebras\" and it was a relief to have a doctor who knew to look for and find a very treatable \"horse\".    Yours - Janki Quintero---------------------  From: Gertrude Bennett CMA ([employer] Message Pool ([id number]))   To: JANKI QUINTERO    Sent: 6/2/2020 7:51:41 AM CDT  Subject: FW: My UTI infection---------------------  From: Gertrude Bennett CMA ([employer] Message Pool ([id number]))   To: Messi Park MD;     Sent: 6/2/2020 7:52:34 AM CDT  Subject: FW: My UTI infection---------------------  From: Messi Park MD   To: [employer] Message Pool ([id number]);     Sent: 6/2/2020 8:20:26 AM CDT  Subject: RE: My UTI infection     Glad you're feeling better, and thank you for the feedback.  clyde---------------------  From: Gertrude Bennett CMA ([facility] Message Pool ([id number]_[facility]))   To: JANKI QUINTERO    Sent: 6/2/2020 8:23:16 AM CDT  Subject: FW: My UTI infection  "

## 2022-02-15 NOTE — TELEPHONE ENCOUNTER
---------------------  From: Gertrude Bennett CMA (Phone Messages Pool ([id number]_[facility]))   To: Messi Park MD;     Sent: 10/14/2019 8:04:27 AM CDT  Subject: FW: Annual Checkup     bmp and lipids okay?      ---------------------  From: PATRICIO QUINTERO on behalf of JANKI QUINTERO  To: [facility]  Sent: 10/12/2019 08:10 a.m. CDT  Subject: Annual Checkup  For Dr Messi Park s Office -    I am in need if my annual checkup and I prefer to have my labs done before the office visit. Please have the doctor order any needed labs and let me know. I will then schedule lab and office appts.  Thanks -  Janki Quintero---------------------  From: Messi Park MD   To: Phone [employer] Pool ([id number]_[place] [facility]);     Sent: 10/14/2019 8:29:29 PM CDT  Subject: RE: Annual Checkup     yes---------------------  From: Tia Romero CMA (Phone Messages Pool ([id number]_[facility]))   To: [employer] Message Pool ([id number]_[place] [facility]);     Sent: 10/15/2019 9:02:37 AM CDT  Subject: FW: Annual Checkup---------------------  From: Gertrude Bennett CMA ([employer] Message Pool ([id number]_[facility]))   To: JANKI QUINTERO    Sent: 10/15/2019 9:45:36 AM CDT  Subject: RE: Annual Checkup     Sony Batista,  We will have fasting lipids and basic panel including electolytes and blood sugar ordered for you.      Thank you,  Gertrude

## 2022-02-15 NOTE — PROGRESS NOTES
Patient:   JANKI QUINTERO            MRN: [id number]            FIN: [id number]               Age:   66 years     Sex:  Female     :  1955   Associated Diagnoses:   Hammertoe of right foot; Right foot pain   Author:   Messi Park MD      Visit Information      Date of Service: 2021 03:24 pm  Performing Location: [facility]  Encounter#: [id number]      Primary Care Provider (PCP):  Messi Park MD    NPI# [id number]      Referring Provider:  Messi Park MD    NPI# [id number]      Chief Complaint   2021 3:37 PM CDT    R foot pain with flexing. 2021 developed bone spur.              Additional Information:No additional information recorded during visit.   Chief complaint and symptoms as noted above and confirmed with patient.  Recent lab and diagnostic studies reviewed with patient      History of Present Illness   2021: Janki presents to clinic with chronic complaints of right forefoot pain.  No described injury or mechanism of trauma.  Describes a exophytic bone spur on the top of her foot for quite some time.  Describes a deeper type pain in the mid forefoot.  Has noticed deformity of her second toe on that side.         Review of Systems   Constitutional:  No fever, No chills.    Eye:  Negative except as documented in history of present illness.    Ear/Nose/Mouth/Throat:  Negative except as documented in history of present illness.    Respiratory:  No shortness of breath.    Cardiovascular:  No chest pain, No palpitations, No peripheral edema, No syncope.    Gastrointestinal:  No nausea, No vomiting, No abdominal pain.    Genitourinary:  No dysuria, No hematuria.    Hematology/Lymphatics:  Negative except as documented in history of present illness.    Endocrine:  No excessive thirst, No polyuria.    Immunologic:  No recurrent fevers.    Musculoskeletal:  No joint pain, No muscle pain.    Neurologic:  Alert and oriented X4, No numbness, No tingling, No headache.        Health Status   Allergies:    Allergic Reactions (Selected)  No Known Medication Allergies   Medications:  (Selected)   Prescriptions  Prescribed  lisinopril 10 mg oral tablet: = 1 tab(s), Oral, daily, # 90 tab(s), 3 Refill(s), Type: Maintenance, Pharmacy: [facility] #[id number], due for appt, 1 tab(s) Oral daily, 62.25, in, 11/01/19 10:24:00 CDT, Height Measured, 158.4, lb, 11/01/19 10:24:00 CDT, Weight Measured  Documented Medications  Documented  Vitamin B Complex with C and Folic Acid oral capsule: 1 cap(s), po, daily, 0 Refill(s), Type: Maintenance  Vitamin D3 1000 intl units oral tablet: 2 tab(s) ( 2,000 International Unit ), po, daily, 0 Refill(s), Type: Maintenance  aspirin 81 mg oral tablet: 1 tab(s) ( 81 mg ), po, daily, 0 Refill(s), Type: Maintenance  vitamin E 400 intl units oral capsule: 1 cap(s) ( 400 International Unit ), po, daily, 0 Refill(s), Type: Maintenance,    Medications          *denotes recorded medication          *aspirin 81 mg oral tablet: 81 mg, 1 tab(s), po, daily, 0 Refill(s).          *Vitamin D3 1000 intl units oral tablet: 2,000 International Unit, 2 tab(s), po, daily, 0 Refill(s).          lisinopril 10 mg oral tablet: 1 tab(s), Oral, daily, 90 tab(s), 3 Refill(s).          *Vitamin B Complex with C and Folic Acid oral capsule: 1 cap(s), po, daily, 0 Refill(s).          *vitamin E 400 intl units oral capsule: 400 International Unit, 1 cap(s), po, daily, 0 Refill(s).       Problem list:    All Problems  Sarcoidosis / SNOMED CT 4459340074 / Confirmed  Essential hypertension / SNOMED CT 48771406 / Confirmed  Hilar adenopathy / SNOMED CT 550247091 / Confirmed  History of breast cancer / SNOMED CT 6062082608 / Confirmed  Menopausal state / SNOMED CT 199402362 / Confirmed  Resolved: Pregnancy / SNOMED CT 048574573  Resolved: Pregnancy / SNOMED CT 434569465  Resolved: Pregnancy / SNOMED CT 980961105      Histories   Past Medical History:    Active  History of breast cancer  (3963617594): Onset in the month of 2011 at 56 years  Comments:  2016 CST 5:03 PM CST - Manjeet Giulia  Stage t1c, n1  ER-positive; s/p XRT; s/p chemo  Menopausal state (810312269): Onset in  at 52 years.  Essential hypertension (35364412)  Hilar adenopathy (557773006)  Sarcoidosis (2502366832)  Resolved  Pregnancy (464693542):  Resolved in  at 21 years.  Pregnancy (736456869):  Resolved in  at 24 years.  Pregnancy (962100190):  Resolved in  at 27 years.   Family History:    Thyroid  Daughter (Mary)  Anemia  Great Grandmother (M) (Melany Lynn, )  CVA - Cerebrovascular accident  Father ()  CAD - Coronary artery disease  Father ()  Breast Cancer  Mother (): onset at 71 .  Comments:  2016 9:16 AM Gertrude More  Stage 4 at the time of the diagnosis.  Grandmother (P) (Ariela Barbosa, ): onset at 89 .     Procedure history:    Screening for malignant neoplasm of colon (4028772503) on 2020 at 64 Years.  Comments:  2020 8:49 AM YOVANI - Megha Kaye - Negative  Date of last mammogram (9721680220) on 3/8/2018 at 62 Years.  Screening for malignant neoplasm of colon (0799585098) on 2016 at 61 Years.  Comments:  2017 8:14 AM CST - Nicolasa Drew MA result--negative  Recommendation:   f/u 3yrs  Breast cancer (268867697) in the month of 3/2011 at 55 Years.  Comments:  2016 9:30 AM Gertrude More  One positive sentinel node.    2016 9:13 AM Gertrude More  Right invasive ductal.  Wide local excision.  Tubal ligation (399567199) on 1997 at 42 Years.  Tonsillectomy (033496703).  Childbirth (4488195156).  Comments:  2016 9:25 AM Gertrude More  x 3   Social History:        Electronic Cigarette/Vaping Assessment            Electronic Cigarette Use: Never.      Alcohol Assessment            Current, Wine (5 oz), 1-2 times per month, 1 drinks/episode average.  Ready to change: No.       Tobacco Assessment            Never (less than 100 in lifetime)            Never (less than 100 in lifetime)      Substance Abuse Assessment            Never      Employment and Education Assessment            Retired, Work/School description: .  Previous employment/school: ..      Home and Environment Assessment             Marital status:Tirso Chen Spouse/Partner name:.      Nutrition and Health Assessment            Type of diet: Regular.  Wants to lose weight: Yes.  Sleeping concerns: No.  Feels highly stressed: Yes.      Exercise and Physical Activity Assessment            Exercise frequency: Daily.  Exercise type: Walking, Yoga.      Sexual Assessment            Sexually active: Yes.  Sexual orientation: Straight or heterosexual.  Contraceptive Use Details: None.        Physical Examination   vital signs stable, as noted above   Vital Signs   9/23/2021 3:37 PM CDT Peripheral Pulse Rate 52 bpm  LOW    Pulse Site Radial artery    HR Method Electronic    Systolic Blood Pressure 145 mmHg  HI    Diastolic Blood Pressure 79 mmHg    Mean Arterial Pressure 101 mmHg    BP Site Right arm    BP Method Electronic      Measurements from flowsheet : Measurements   9/23/2021 3:37 PM CDT    Weight Measured - Standard                166 lb     General:  Alert and oriented, No acute distress.    Eye:  Extraocular movements are intact.    Respiratory:  Respirations are non-labored.    Cardiovascular:  Normal rate.    Musculoskeletal:  Normal range of motion, No tenderness, right 2nd toe hammertoe deformity; mild right great toe bunion deformity.    Neurologic:  Alert, Oriented, Normal motor function, No focal deficits.    Cognition and Speech:  Oriented, Speech clear and coherent.    Psychiatric:  Appropriate mood & affect.       Review / Management   Results review      Impression and Plan   Diagnosis     Hammertoe of right foot (YGI24-VX M20.41).     Right foot pain (JAD91-CZ M79.671).     - arrange  for Xray of foot  - already using orthotic  - hold off on podiatry input for now - consider in the future if symptoms worsening

## 2022-02-15 NOTE — TELEPHONE ENCOUNTER
Entered by Chelsey Tan CMA on November 04, 2020 12:42:49 PM CST  Lab appt 11/9.      ---------------------  From: Gertrude Bennett CMA   To: [address] ([id number]_[facility]) ;     Sent: 11/4/2020 11:15:54 AM CST  Subject: General Message     please check pt's wt and BP when she presents for labs - added to RTCSticky note reminder added

## 2022-02-15 NOTE — TELEPHONE ENCOUNTER
---------------------  From: Messi Park MD   To: JANKI QUINTERO    Sent: 11/10/2020 8:04:05 AM CST  Subject: General Message      Labs for your review.  We can discuss in more detail at future clinic visit.       Results:  Date Result Name Ind Value Ref Range   11/9/2020 8:43 AM Sodium Level  136 mmol/L (135 - 146)   11/9/2020 8:43 AM Potassium Level  4.2 mmol/L (3.5 - 5.3)   11/9/2020 8:43 AM Chloride Level  101 mmol/L (98 - 110)   11/9/2020 8:43 AM CO2 Level  27 mmol/L (20 - 32)   11/9/2020 8:43 AM Glucose Level  90 mg/dL (65 - 99)   11/9/2020 8:43 AM BUN  15 mg/dL (7 - 25)   11/9/2020 8:43 AM Creatinine Level  0.74 mg/dL (0.50 - 0.99)   11/9/2020 8:43 AM BUN/Creat Ratio  NOT APPLICABLE (6 - 22)   11/9/2020 8:43 AM eGFR  84 mL/min/1.73m2 (> OR = 60 - )   11/9/2020 8:43 AM eGFR African American  97 mL/min/1.73m2 (> OR = 60 - )   11/9/2020 8:43 AM Calcium Level  9.3 mg/dL (8.6 - 10.4)   11/9/2020 8:43 AM Cholesterol ((H)) 245 mg/dL ( - <200)   11/9/2020 8:43 AM Non-HDL Cholesterol ((H)) 134 ( - <130)   11/9/2020 8:43 AM HDL  111 mg/dL (> OR = 50 - )   11/9/2020 8:43 AM Cholesterol/HDL Ratio  2.2 ( - <5.0)   11/9/2020 8:43 AM LDL ((H)) 120    11/9/2020 8:43 AM Triglyceride  53 mg/dL ( - <150)   11/9/2020 8:43 AM Hgb  14.2 gm/dL (11.7 - 15.5)

## 2022-02-15 NOTE — PROGRESS NOTES
Patient:   JANKI QUINTERO            MRN: [id number]            FIN: [id number]               Age:   62 years     Sex:  Female     :  1955   Associated Diagnoses:   Essential hypertension; Microcytic anemia; History of breast cancer   Author:   Messi Park MD      Visit Information      Date of Service: 10/12/2017 12:41 pm  Performing Location: [facility]  Encounter#: [id number]      Primary Care Provider (PCP):  Messi Park MD    NPI# [id number]      Referring Provider:  Messi Park MD    NPI# [id number]      Chief Complaint            Additional Information:No additional information recorded during visit.   Chief complaint and symptoms as noted above and confirmed with patient.  Recent lab and diagnostic studies reviewed with patient      History of Present Illness   10/12/2017: Presents to clinic for annual physical.  Gustavo doing well overall.  They are planning to leave for [place] early November for his second course of radioactive octreotide.  Tolerating medications.  NO issues with upper extremity lymphedema. Not sure when she will transition to Medicare.          Review of Systems   Constitutional:  No fever, No chills.    Eye:  Negative except as documented in history of present illness.    Ear/Nose/Mouth/Throat:  Negative except as documented in history of present illness.    Respiratory:  No shortness of breath.    Cardiovascular:  No chest pain, No palpitations, No peripheral edema, No syncope.    Gastrointestinal:  No nausea, No vomiting, No abdominal pain.    Genitourinary:  No dysuria, No hematuria.    Hematology/Lymphatics:  Negative except as documented in history of present illness.    Endocrine:  No excessive thirst, No polyuria.    Immunologic:  No recurrent fevers.    Musculoskeletal:  No joint pain, No muscle pain.    Neurologic:  Alert and oriented X4, No numbness, No tingling, No headache.       Health Status   Allergies:    Allergic Reactions (Selected)  No Known Medication  Allergies   Medications:  (Selected)   Prescriptions  Prescribed  lisinopril 10 mg oral tablet: 1 tab(s) ( 10 mg ), PO, Daily, # 90 tab(s), 3 Refill(s), Type: Maintenance  Documented Medications  Documented  Vitamin B Complex with C and Folic Acid oral capsule: 1 cap(s), po, daily, 0 Refill(s), Type: Maintenance  Vitamin D3 1000 intl units oral tablet: 2 tab(s) ( 2,000 International Unit ), po, daily, 0 Refill(s), Type: Maintenance  aspirin 81 mg oral tablet: 1 tab(s) ( 81 mg ), po, daily, 0 Refill(s), Type: Maintenance  vitamin E 400 intl units oral capsule: 1 cap(s) ( 400 International Unit ), po, daily, 0 Refill(s), Type: Maintenance   Problem list:    All Problems  Sarcoidosis / SNOMED CT 2958504409 / Confirmed  Essential hypertension / SNOMED CT 09682868 / Confirmed  Hilar adenopathy / SNOMED CT 390948462 / Confirmed  History of breast cancer / SNOMED CT 9913769742 / Confirmed  Menopausal state / SNOMED CT 502228158 / Confirmed  Resolved: Pregnancy / SNOMED CT 937179816  Resolved: Pregnancy / SNOMED CT 935583240  Resolved: Pregnancy / SNOMED CT 954586973      Histories   Past Medical History:    Active  History of breast cancer (2370133471): Onset in the month of 2011 at 56 years  Comments:  2016 CST 5:03 PM CST - Giulia Burroughs  Stage t1c, n1  ER-positive; s/p XRT; s/p chemo  Menopausal state (993197999): Onset in  at 52 years.  Essential hypertension (71174316)  Hilar adenopathy (386594669)  Sarcoidosis (8515658684)  Resolved  Pregnancy (389815589):  Resolved in  at 21 years.  Pregnancy (980740185):  Resolved in  at 24 years.  Pregnancy (983597197):  Resolved in  at 27 years.   Family History:    Thyroid  Daughter (Mary)  CVA - Cerebrovascular accident  Father ()  CAD - Coronary artery disease  Father ()  Leukemia  Grandmother (P)  Breast Cancer  Mother (): onset at 71 .  Comments:  2016 9:16 AM - Gertrude Sevilla  Stage 4 at the time of the  diagnosis.  Grandmother (P): onset at 89 .     Procedure history:    Screening for malignant neoplasm of colon (6933211125) on 12/21/2016 at 61 Years.  Comments:  1/6/2017 8:14 AM - Nicolasa Drew MA result--negative  Recommendation:   f/u 3yrs  Breast cancer (276136809) in the month of 3/2011 at 55 Years.  Comments:  12/12/2016 9:30 MAURO - Gertrude Sevilla  One positive sentinel node.    12/12/2016 9:13 Gertrude Norman  Right invasive ductal.  Wide local excision.  Tubal ligation (510211255) on 12/31/1997 at 42 Years.  Tonsillectomy (116629850).  Childbirth (6060774672).  Comments:  12/12/2016 9:25 Gertrude Norman  x 3   Social History:        Alcohol Assessment                     Comments:                      12/30/2016 - Lisa Lee                     1 glass.      Tobacco Assessment            Never smoker      Substance Abuse Assessment: Denies Substance Abuse      Employment and Education Assessment            Retired, Work/School description: .  Previous employment/school: ..      Home and Environment Assessment            Marital status: .      Exercise and Physical Activity Assessment: Regular exercise            Exercise frequency: Daily.  Exercise type: Walking.        Physical Examination   vital signs stable, as noted above   VS/Measurements   General:  Alert and oriented, No acute distress.    Eye:  Extraocular movements are intact.    HENT:  Normocephalic, Tympanic membranes are clear, Oral mucosa is moist, No pharyngeal erythema.    Neck:  Supple, No lymphadenopathy.    Respiratory:  Lungs are clear to auscultation, Respirations are non-labored.    Cardiovascular:  Normal rate, Regular rhythm, No murmur, No edema.    Gastrointestinal:  Soft.    Musculoskeletal:  Normal range of motion, Normal strength, No tenderness.    Neurologic:  Alert, Oriented, Normal motor function, No focal deficits.    Cognition and Speech:  Oriented, Speech clear and coherent.     Psychiatric:  Appropriate mood & affect.       Review / Management   Results review:  Lab results   10/9/2017 8:26 AM CDT Glucose Level 88 mg/dL    Cholesterol 245 mg/dL  HI    Non-     mg/dL    Chol/HDL Ratio 1.8    LDL 97    Triglyceride 62 mg/dL   .       Impression and Plan   Diagnosis     Essential hypertension (XHH92-HS I10).     Microcytic anemia (XTQ44-SM D50.9).     History of breast cancer (YCL98-MG Z85.3).       .) h/o stage I breast Ca, '11; + sentinel node; negative right axillary LN dissection; high Oncoscore   - treated with XRT and chemo   - completed 4.5 yrs of Arimidex; stopped due to polyarthralgia    .) hypertension, controlled   - lisinopril 10mg daily    .) health maintenance   - CONNIE, '14 - normal   - PHQ9: normal   - no longer needing PAP   - annual mammography   - normal Cologuard (12/2016); repeat q3 yrs   - will need Prevnar at age 63    RTC annually      Professional Services   Counseling Summary:  This was a 25 minute visit with greater than 50% of that time spent counseling the patient, including review of outside records.

## 2022-02-15 NOTE — TELEPHONE ENCOUNTER
---------------------  From: Gertrude Bennett CMA (Sendmebox Pool ([id number]_[facility]))   To: Messi Park MD;     Sent: 11/4/2020 9:46:19 AM CST  Subject: FW: Annual Checkup with Dr. Messi Park and info for my file           ---------------------  From: PATRICIO QUINTERO on behalf of JANKI QUINTERO  To: [employer] Pool ([id number]_[facility])  Sent: 11/04/2020 09:21 a.m. CST  Subject: RE: Annual Checkup with Dr. Messi Park and info for my file  I have scheduled my blood work for November 9 and made an appointment for a video visit for Dec 2.    But I have no preference of video over in person.  While I take very few risks and don t do indoor activities, my guess is that the clinic is much safer than the grocery store.  Let me know if the doctor thinks an in person is needed and I will change the video visit.       Addendum by Gertrude Bennett CMA on October 16, 2020 1:34:34 PM CDT  ---------------------  From: Gertrude Bennett CMA (Sendmebox Pool ([id number]_[facility]))  To: JANKI QUINTERO  Sent: 10/16/2020 1:34:34 PM CDT  Subject: RE: Annual Checkup with Dr. Messi Park and info for my file       Sony Batista,  We have you due for fasting labs prior to your visit (anytime after 11/1).   We can do your Annual Wellness visit via video, if you choose.  If we do it as a video visit, we would send you out all the required paperwork first,  and then once we get it back in clinic we can set you up for your Annual Wellness visit.  Let us know which way you prefer to schedule.       Thank you for the updates and attachments, we will update your chart.       Stay wellGertrude       Addendum by Tia Romero CMA on October 16, 2020 12:24:59 PM CDT  ---------------------  From: Tia Romero CMA (Phone Messages Pool ([id number]_[facility]))  To: [facility]IE Message Pool ([id number]_[facility]);  Sent: 10/16/2020 12:24:59 PM CDT  Subject: FW: Annual Checkup with Dr. Messi Park and info for my file  ---------------------  From: PATRICIO QUINTERO  on behalf of JANKI QUINTERO  [facility]  Sent: 10/16/2020 11:53 a.m. CDT  Subject: Annual Checkup with Dr. Messi Park and info for my file  I am due for an annual appointment in early November and want to know how Dr. Park is handling these.  Do we do blood work and video chat or does he want an office visit?  Also I have information to add to my file.  I had my annual (over 65) flue shot on Sept 9, 2020.  I had a Mammogram and Magnetic Breast Image both done at [facility] on Sept. 16, 2020.  Both had negative results and I have attached the doctor s reports.  If actual images are needed then they can be accessed through [facility] or I can have them sent up from [facility].  Janki Quintero---------------------  From: Messi Park MD   To: [facility] Pool ([id number]_[facility]);     Sent: 11/4/2020 10:03:43 AM CST  Subject: RE: Annual Checkup with Dr. Messi Park and info for my file     I'm indifferent.  I'm confident we can manage remotely unless something physically Janki wants to show me.    Given significant escalation in community case rates, I want patients comfortable with whatever medium of follow-up cares.---------------------  From: Gertrude Bennett CMA ([employer] Message Pool ([id number]_[facility]))   To: Messi Park MD; JANKI QUINTERO    Sent: 11/4/2020 10:07:45 AM CST  Subject: FW: Annual Checkup with Dr. Messi Park and info for my file     Sony Batista,  Please see Dr Park's message  Let us know if you have any further questions.      Gertrude

## 2022-06-09 ENCOUNTER — VIRTUAL VISIT (OUTPATIENT)
Dept: FAMILY MEDICINE | Facility: CLINIC | Age: 67
End: 2022-06-09

## 2022-06-09 DIAGNOSIS — R82.90 CLOUDY URINE: ICD-10-CM

## 2022-06-09 DIAGNOSIS — N30.00 ACUTE CYSTITIS WITHOUT HEMATURIA: Primary | ICD-10-CM

## 2022-06-09 LAB
ALBUMIN UR-MCNC: NEGATIVE MG/DL
APPEARANCE UR: CLEAR
BACTERIA #/AREA URNS HPF: ABNORMAL /HPF
BILIRUB UR QL STRIP: NEGATIVE
COLOR UR AUTO: YELLOW
GLUCOSE UR STRIP-MCNC: NEGATIVE MG/DL
HGB UR QL STRIP: NEGATIVE
KETONES UR STRIP-MCNC: NEGATIVE MG/DL
LEUKOCYTE ESTERASE UR QL STRIP: ABNORMAL
NITRATE UR QL: NEGATIVE
PH UR STRIP: 6.5 [PH] (ref 5–7)
RBC #/AREA URNS AUTO: ABNORMAL /HPF
SP GR UR STRIP: 1.01 (ref 1–1.03)
UROBILINOGEN UR STRIP-ACNC: 0.2 E.U./DL
WBC #/AREA URNS AUTO: ABNORMAL /HPF

## 2022-06-09 PROCEDURE — 99441 PR PHYSICIAN TELEPHONE EVALUATION 5-10 MIN: CPT | Mod: 95 | Performed by: FAMILY MEDICINE

## 2022-06-09 PROCEDURE — 81001 URINALYSIS AUTO W/SCOPE: CPT | Performed by: FAMILY MEDICINE

## 2022-06-09 RX ORDER — ASPIRIN 81 MG/1
TABLET ORAL DAILY
COMMUNITY
End: 2024-01-18

## 2022-06-09 RX ORDER — NITROFURANTOIN 25; 75 MG/1; MG/1
100 CAPSULE ORAL 2 TIMES DAILY
Qty: 14 CAPSULE | Refills: 0 | Status: SHIPPED | OUTPATIENT
Start: 2022-06-09 | End: 2022-06-16

## 2022-06-09 NOTE — PROGRESS NOTES
Telemedicine Visit  Call time: 1:15 PM through 1:23 PM, 8 minutes  Patient is at home in [place], [place]  Physician in clinic in [place], [place]      Clinical Decision Making:    At the end of the encounter, I discussed results, diagnosis, medications. Discussed red flags for immediate return to clinic/ER, as well as indications for follow up if no improvement. Patient understood and agreed to plan. Patient was stable for discharge.      ICD-10-CM    1. Acute cystitis without hematuria  N30.00    2. Cloudy urine  R82.90 UA macro with reflex to Microscopic and Culture - Clinc Collect     Urine Microscopic     CANCELED: UA macro with reflex to Microscopic and Culture - Clinc Collect     UA consistent with infection  Treating with macrobid  UC pending        There are no Patient Instructions on file for this visit.   No follow-ups on file.      chief complaint    HPI:  Lynne Pierson is a 67 year old female who presents today complaining of cloudy urine with strong odor.  She had a plane ride to [place] and when she got there she had dysuria for a couple of days until she passed something that looked like a white mucous plug.  Her pain then went away.  She recently flew home and since she flew home has had cloudy urine especially first thing in the morning.  She has a strong odor to her urine.  She denies dysuria.  No fevers, nausea, back pain.  No vaginal itching, discharge, odor  She has had a UTI once in the past    History obtained from the patient.    Problem List:  There are no relevant problems documented for this patient.      No past medical history on file.    Social History     Tobacco Use     Smoking status: Not on file     Smokeless tobacco: Not on file   Substance Use Topics     Alcohol use: Not on file       Review of systems  negative except listed in HPI    There were no vitals filed for this visit.    Physical Exam  Telemedicine visit  In general she is alert, oriented, and in no acute  distress  UA:  Results for orders placed or performed in visit on 06/09/22   UA macro with reflex to Microscopic and Culture - Clinc Collect     Status: Abnormal    Specimen: Urine, Clean Catch   Result Value Ref Range    Color Urine Yellow Colorless, Straw, Light Yellow, Yellow    Appearance Urine Clear Clear    Glucose Urine Negative Negative mg/dL    Bilirubin Urine Negative Negative    Ketones Urine Negative Negative mg/dL    Specific Gravity Urine 1.010 1.003 - 1.035    Blood Urine Negative Negative    pH Urine 6.5 5.0 - 7.0    Protein Albumin Urine Negative Negative mg/dL    Urobilinogen Urine 0.2 0.2, 1.0 E.U./dL    Nitrite Urine Negative Negative    Leukocyte Esterase Urine Small (A) Negative   Urine Microscopic     Status: Abnormal   Result Value Ref Range    Bacteria Urine Few (A) None Seen /HPF    RBC Urine None Seen 0-2 /HPF /HPF    WBC Urine 5-10 (A) 0-5 /HPF /HPF    Narrative    Urine Culture not indicated

## 2022-10-03 ENCOUNTER — HEALTH MAINTENANCE LETTER (OUTPATIENT)
Age: 67
End: 2022-10-03

## 2022-11-13 DIAGNOSIS — I10 PRIMARY HYPERTENSION: Primary | ICD-10-CM

## 2022-11-14 ENCOUNTER — DOCUMENTATION ONLY (OUTPATIENT)
Dept: LAB | Facility: CLINIC | Age: 67
End: 2022-11-14

## 2022-11-14 DIAGNOSIS — E78.5 DYSLIPIDEMIA: ICD-10-CM

## 2022-11-14 DIAGNOSIS — I10 PRIMARY HYPERTENSION: Primary | ICD-10-CM

## 2022-11-14 PROBLEM — Z85.3 HISTORY OF MALIGNANT NEOPLASM OF BREAST: Status: ACTIVE | Noted: 2022-11-14

## 2022-11-14 RX ORDER — LISINOPRIL 10 MG/1
10 TABLET ORAL DAILY
Qty: 60 TABLET | Refills: 0 | Status: SHIPPED | OUTPATIENT
Start: 2022-11-14 | End: 2022-11-15

## 2022-11-14 NOTE — TELEPHONE ENCOUNTER
Routing refill request to provider for review/approval because:  Please associate diagnosis, has apt 12/20     MARIA ANTONIA Olmos  [facility]

## 2022-11-15 ENCOUNTER — TELEPHONE (OUTPATIENT)
Dept: FAMILY MEDICINE | Facility: CLINIC | Age: 67
End: 2022-11-15

## 2022-11-15 DIAGNOSIS — I10 PRIMARY HYPERTENSION: ICD-10-CM

## 2022-11-15 RX ORDER — LISINOPRIL 10 MG/1
10 TABLET ORAL DAILY
Qty: 90 TABLET | Refills: 0 | Status: SHIPPED | OUTPATIENT
Start: 2022-11-15 | End: 2022-12-18

## 2022-11-15 NOTE — TELEPHONE ENCOUNTER
Prescription approved per [facility] Refill Protocol.    Last Written Prescription Date: 11/14/22  Last Fill Quantity: 60,  # refills: 0   Last office visit: 12/14/21   Future Office Visit:   Next 5 appointments (look out 90 days)    Dec 20, 2022  1:00 PM  (Arrive by 12:40 PM)  Annual Wellness Visit with Hong Park MD  [facility] ([facility] ) [address] [place] [id number] [phone]

## 2022-12-13 ENCOUNTER — LAB (OUTPATIENT)
Dept: LAB | Facility: CLINIC | Age: 67
End: 2022-12-13
Payer: MEDICARE

## 2022-12-13 DIAGNOSIS — I10 PRIMARY HYPERTENSION: ICD-10-CM

## 2022-12-13 DIAGNOSIS — E78.5 DYSLIPIDEMIA: ICD-10-CM

## 2022-12-13 LAB
ANION GAP SERPL CALCULATED.3IONS-SCNC: 11 MMOL/L (ref 7–15)
BUN SERPL-MCNC: 13.8 MG/DL (ref 8–23)
CALCIUM SERPL-MCNC: 9 MG/DL (ref 8.8–10.2)
CHLORIDE SERPL-SCNC: 104 MMOL/L (ref 98–107)
CHOLEST SERPL-MCNC: 254 MG/DL
CREAT SERPL-MCNC: 0.83 MG/DL (ref 0.51–0.95)
DEPRECATED HCO3 PLAS-SCNC: 26 MMOL/L (ref 22–29)
GFR SERPL CREATININE-BSD FRML MDRD: 76 ML/MIN/1.73M2
GLUCOSE SERPL-MCNC: 86 MG/DL (ref 70–99)
HDLC SERPL-MCNC: 119 MG/DL
LDLC SERPL CALC-MCNC: 125 MG/DL
NONHDLC SERPL-MCNC: 135 MG/DL
POTASSIUM SERPL-SCNC: 5 MMOL/L (ref 3.4–5.3)
SODIUM SERPL-SCNC: 141 MMOL/L (ref 136–145)
TRIGL SERPL-MCNC: 51 MG/DL

## 2022-12-13 PROCEDURE — 36415 COLL VENOUS BLD VENIPUNCTURE: CPT

## 2022-12-13 PROCEDURE — 80048 BASIC METABOLIC PNL TOTAL CA: CPT

## 2022-12-13 PROCEDURE — 80061 LIPID PANEL: CPT

## 2022-12-14 ASSESSMENT — ENCOUNTER SYMPTOMS
SHORTNESS OF BREATH: 0
CONSTIPATION: 0
DIZZINESS: 0
NAUSEA: 0
FEVER: 0
DIARRHEA: 0
HEADACHES: 0
ARTHRALGIAS: 0
SORE THROAT: 0
HEMATURIA: 0
WEAKNESS: 0
FREQUENCY: 0
JOINT SWELLING: 0
COUGH: 0
DYSURIA: 0
PARESTHESIAS: 0
HEMATOCHEZIA: 0
ABDOMINAL PAIN: 0
HEARTBURN: 0
PALPITATIONS: 0
BREAST MASS: 0
EYE PAIN: 0
MYALGIAS: 0
CHILLS: 0
NERVOUS/ANXIOUS: 0

## 2022-12-14 ASSESSMENT — ACTIVITIES OF DAILY LIVING (ADL): CURRENT_FUNCTION: NO ASSISTANCE NEEDED

## 2022-12-18 PROBLEM — Z00.00 HEALTH CARE MAINTENANCE: Status: ACTIVE | Noted: 2022-12-18

## 2022-12-18 PROBLEM — D86.9 SARCOIDOSIS: Status: ACTIVE | Noted: 2022-12-18

## 2022-12-21 ENCOUNTER — OFFICE VISIT (OUTPATIENT)
Dept: FAMILY MEDICINE | Facility: CLINIC | Age: 67
End: 2022-12-21
Payer: MEDICARE

## 2022-12-21 VITALS
HEART RATE: 52 BPM | HEIGHT: 62 IN | DIASTOLIC BLOOD PRESSURE: 85 MMHG | TEMPERATURE: 97.8 F | BODY MASS INDEX: 30.55 KG/M2 | SYSTOLIC BLOOD PRESSURE: 136 MMHG | WEIGHT: 166 LBS

## 2022-12-21 DIAGNOSIS — Z12.11 SCREENING FOR COLORECTAL CANCER: Primary | ICD-10-CM

## 2022-12-21 DIAGNOSIS — Z12.12 SCREENING FOR COLORECTAL CANCER: Primary | ICD-10-CM

## 2022-12-21 DIAGNOSIS — Z85.3 HISTORY OF MALIGNANT NEOPLASM OF BREAST: ICD-10-CM

## 2022-12-21 DIAGNOSIS — I10 PRIMARY HYPERTENSION: ICD-10-CM

## 2022-12-21 DIAGNOSIS — Z00.00 HEALTH CARE MAINTENANCE: ICD-10-CM

## 2022-12-21 PROCEDURE — G0439 PPPS, SUBSEQ VISIT: HCPCS | Performed by: INTERNAL MEDICINE

## 2022-12-21 PROCEDURE — 99213 OFFICE O/P EST LOW 20 MIN: CPT | Mod: 25 | Performed by: INTERNAL MEDICINE

## 2022-12-21 RX ORDER — LISINOPRIL 10 MG/1
10 TABLET ORAL DAILY
Qty: 90 TABLET | Refills: 4 | Status: SHIPPED | OUTPATIENT
Start: 2022-12-21 | End: 2024-01-04

## 2022-12-21 ASSESSMENT — ENCOUNTER SYMPTOMS
MYALGIAS: 0
DIZZINESS: 0
HEARTBURN: 0
CHILLS: 0
NAUSEA: 0
SHORTNESS OF BREATH: 0
COUGH: 0
HEADACHES: 0
DIARRHEA: 0
ARTHRALGIAS: 0
JOINT SWELLING: 0
EYE PAIN: 0
FEVER: 0
HEMATOCHEZIA: 0
PARESTHESIAS: 0
PALPITATIONS: 0
BREAST MASS: 0
CONSTIPATION: 0
FREQUENCY: 0
DYSURIA: 0
SORE THROAT: 0
ABDOMINAL PAIN: 0
NERVOUS/ANXIOUS: 0
WEAKNESS: 0
HEMATURIA: 0

## 2022-12-21 ASSESSMENT — ACTIVITIES OF DAILY LIVING (ADL): CURRENT_FUNCTION: NO ASSISTANCE NEEDED

## 2022-12-21 NOTE — ASSESSMENT & PLAN NOTE
- DEXA, '19 - normal     - no definitive plans to recheck   - PHQ9: normal   - no longer needing PAP   - normal Cologuard (1/2020); repeat in '23   - adult vaccinations updated   - completed COVID vaccination + all boosters

## 2022-12-21 NOTE — ASSESSMENT & PLAN NOTE
h/o stage I breast Ca, '11; + sentinel node; negative right axillary LN dissection; high Onco-score   - treated with XRT and chemo   - completed 4.5 yrs of Arimidex; stopped due to polyarthralgia   - maintains surveillance through [facility] for mammography

## 2022-12-21 NOTE — PROGRESS NOTES
"SUBJECTIVE:   Lynne is a 67 year old who presents for Preventive Visit.  Presents for regular follow-up.  No expressed complaints.  Still doing regular mammogram testing through [facility] and would like to continue there for continuity purposes if feasible, though no longer following with any specific male provider.  No specific complaints or concerns other than noticing some increased bruisability and bleeding and we question whether her aspirin use may be contributing.  Patient has been advised of split billing requirements and indicates understanding: Yes  Are you in the first 12 months of your Medicare coverage?      Healthy Habits:     In general, how would you rate your overall health?  Excellent    Frequency of exercise:  6-7 days/week    Duration of exercise:  Greater than 60 minutes    Do you usually eat at least 4 servings of fruit and vegetables a day, include whole grains    & fiber and avoid regularly eating high fat or \"junk\" foods?  Yes    Taking medications regularly:  Yes    Ability to successfully perform activities of daily living:  No assistance needed    Home Safety:  Lack of grab bars in the bathroom    Hearing Impairment:  No hearing concerns    In the past 6 months, have you been bothered by leaking of urine? Yes    In general, how would you rate your overall mental or emotional health?  Good      PHQ-2 Total Score: 1    Additional concerns today:  Yes      Have you ever done Advance Care Planning? (For example, a Health Directive, POLST, or a discussion with a medical provider or your loved ones about your wishes): Yes, patient states has an Advance Care Planning document and will bring a copy to the clinic.      Right Ear:      1000 Hz RESPONSE- on Level:   20 db  (Conditioning sound)   1000 Hz: RESPONSE- on Level:   20 db    2000 Hz: RESPONSE- on Level:   20 db    4000 Hz: RESPONSE- on Level:   20 db     Left Ear:      4000 Hz: RESPONSE- on Level:   20 db    2000 Hz: RESPONSE- on Level:   20 " db    1000 Hz: RESPONSE- on Level:   20 db     500 Hz: RESPONSE- on Level: 25 db      Hearing Acuity: Pass    Hearing Assessment: normal   Fall risk  Fallen 2 or more times in the past year?: No  Any fall with injury in the past year?: No    Cognitive Screening no further screening indicated        Reviewed and updated as needed this visit by clinical staff                  Reviewed and updated as needed this visit by Provider                 Social History     Tobacco Use     Smoking status: Not on file     Smokeless tobacco: Not on file   Substance Use Topics     Alcohol use: Not on file         Alcohol Use 12/14/2022   Prescreen: >3 drinks/day or >7 drinks/week? No         Current providers sharing in care for this patient include:   Patient Care Team:  Hong Park MD as PCP - General (HOSPITALIST)  Hong Park MD as Assigned PCP    The following health maintenance items are reviewed in Epic and correct as of today:  Health Maintenance   Topic Date Due     ANNUAL REVIEW OF HM ORDERS  Never done     ADVANCE CARE PLANNING  Never done     MEDICARE ANNUAL WELLNESS VISIT  12/14/2022     COLORECTAL CANCER SCREENING  01/13/2023     FALL RISK ASSESSMENT  12/21/2023     MAMMO SCREENING  10/04/2024     DTAP/TDAP/TD IMMUNIZATION (4 - Td or Tdap) 11/29/2026     LIPID  12/13/2027     DEXA  11/08/2034     PHQ-2 (once per calendar year)  Completed     INFLUENZA VACCINE  Completed     Pneumococcal Vaccine: 65+ Years  Completed     ZOSTER IMMUNIZATION  Completed     COVID-19 Vaccine  Completed     IPV IMMUNIZATION  Aged Out     MENINGITIS IMMUNIZATION  Aged Out     HEPATITIS C SCREENING  Discontinued         FHS-7:   Breast CA Risk Assessment (FHS-7) 12/14/2022   Did any of your first-degree relatives have breast or ovarian cancer? Yes   Did any of your relatives have bilateral breast cancer? No   Did any man in your family have breast cancer? No   Did any woman in your family have breast and ovarian cancer? No   Did  "any woman in your family have breast cancer before age 50 y? No   Do you have 2 or more relatives with breast and/or ovarian cancer? Yes   Do you have 2 or more relatives with breast and/or bowel cancer? Yes       Review of Systems   Constitutional: Negative for chills and fever.   HENT: Negative for congestion, ear pain, hearing loss and sore throat.    Eyes: Negative for pain and visual disturbance.   Respiratory: Negative for cough and shortness of breath.    Cardiovascular: Negative for chest pain, palpitations and peripheral edema.   Gastrointestinal: Negative for abdominal pain, constipation, diarrhea, heartburn, hematochezia and nausea.   Breasts:  Negative for tenderness, breast mass and discharge.   Genitourinary: Positive for urgency. Negative for dysuria, frequency, genital sores, hematuria, pelvic pain, vaginal bleeding and vaginal discharge.   Musculoskeletal: Negative for arthralgias, joint swelling and myalgias.   Skin: Negative for rash.   Neurological: Negative for dizziness, weakness, headaches and paresthesias.   Psychiatric/Behavioral: Negative for mood changes. The patient is not nervous/anxious.          OBJECTIVE:   /85 (BP Location: Left arm, Patient Position: Sitting, Cuff Size: Adult Regular)   Pulse 52   Temp 97.8  F (36.6  C)   Ht 1.575 m (5' 2\")   Wt 75.3 kg (166 lb)   BMI 30.36 kg/m   Estimated body mass index is 29.12 kg/m  as calculated from the following:    Height as of 12/14/21: 1.588 m (5' 2.5\").    Weight as of 12/14/21: 73.4 kg (161 lb 12.8 oz).  Physical Exam  GENERAL: healthy, alert and no distress  NECK: no adenopathy, no asymmetry, masses, or scars and thyroid normal to palpation  RESP: lungs clear to auscultation - no rales, rhonchi or wheezes  CV: regular rate and rhythm, normal S1 S2, no S3 or S4, no murmur, click or rub, no peripheral edema and peripheral pulses strong  ABDOMEN: soft, nontender, no hepatosplenomegaly, no masses and bowel sounds normal  MS: no " gross musculoskeletal defects noted, no edema    Diagnostic Test Results:  Labs reviewed in Epic    ASSESSMENT / PLAN:     Problem List Items Addressed This Visit        Circulatory    Primary hypertension     controlled   - lisinopril 10mg daily         Relevant Medications    lisinopril (ZESTRIL) 10 MG tablet       Other    History of malignant neoplasm of breast     h/o stage I breast Ca, '11; + sentinel node; negative right axillary LN dissection; high Onco-score   - treated with XRT and chemo   - completed 4.5 yrs of Arimidex; stopped due to polyarthralgia   - maintains surveillance through [facility] for mammography         Health care maintenance     - DEXA, '19 - normal     - no definitive plans to recheck   - PHQ9: normal   - no longer needing PAP   - normal Cologuard (1/2020); repeat in '23   - adult vaccinations updated   - completed COVID vaccination + all boosters        Other Visit Diagnoses     Screening for colorectal cancer    -  Primary    Relevant Orders    COLOGUARD(EXACT SCIENCES)            COUNSELING:  Reviewed preventive health counseling, as reflected in patient instructions        She has no history on file for tobacco use.      Appropriate preventive services were discussed with this patient, including applicable screening as appropriate for cardiovascular disease, diabetes, osteopenia/osteoporosis, and glaucoma.  As appropriate for age/gender, discussed screening for colorectal cancer, prostate cancer, breast cancer, and cervical cancer. Checklist reviewing preventive services available has been given to the patient.    Reviewed patients plan of care and provided an AVS. The Basic Care Plan (routine screening as documented in Health Maintenance) for Lynne meets the Care Plan requirement. This Care Plan has been established and reviewed with the Patient.          Hong Park MD  [facility]    Identified Health Risks:

## 2023-01-21 ENCOUNTER — LAB (OUTPATIENT)
Dept: FAMILY MEDICINE | Facility: CLINIC | Age: 68
End: 2023-01-21
Payer: MEDICARE

## 2023-01-21 DIAGNOSIS — Z12.12 SCREENING FOR COLORECTAL CANCER: ICD-10-CM

## 2023-01-21 DIAGNOSIS — Z12.11 SCREENING FOR COLORECTAL CANCER: ICD-10-CM

## 2023-02-02 LAB — NONINV COLON CA DNA+OCC BLD SCRN STL QL: NEGATIVE

## 2023-12-20 ENCOUNTER — DOCUMENTATION ONLY (OUTPATIENT)
Dept: FAMILY MEDICINE | Facility: CLINIC | Age: 68
End: 2023-12-20
Payer: MEDICARE

## 2023-12-20 DIAGNOSIS — E78.5 DYSLIPIDEMIA: ICD-10-CM

## 2023-12-20 DIAGNOSIS — D86.9 SARCOIDOSIS: ICD-10-CM

## 2023-12-20 DIAGNOSIS — I10 PRIMARY HYPERTENSION: Primary | ICD-10-CM

## 2023-12-20 NOTE — PROGRESS NOTES
"Rich Park,     This patient, SUSSY Pierson ( 1955), created a lab only appointment for 1/10/2024, at 0730. The appointment note states \"Blood work for emily fragoso.,\" but currently, from what I can see, this patient has no lab orders in their chart.     Could you please add lab orders?     Thank you,   DEMETRIS Lowe  "

## 2024-01-04 DIAGNOSIS — I10 PRIMARY HYPERTENSION: ICD-10-CM

## 2024-01-05 RX ORDER — LISINOPRIL 10 MG/1
10 TABLET ORAL DAILY
Qty: 90 TABLET | Refills: 3 | Status: SHIPPED | OUTPATIENT
Start: 2024-01-05

## 2024-01-10 ENCOUNTER — LAB (OUTPATIENT)
Dept: LAB | Facility: CLINIC | Age: 69
End: 2024-01-10
Payer: MEDICARE

## 2024-01-10 DIAGNOSIS — R79.82 ELEVATED C-REACTIVE PROTEIN (CRP): ICD-10-CM

## 2024-01-10 DIAGNOSIS — E78.5 DYSLIPIDEMIA: ICD-10-CM

## 2024-01-10 DIAGNOSIS — I10 PRIMARY HYPERTENSION: ICD-10-CM

## 2024-01-10 DIAGNOSIS — Z82.3 FAMILY HISTORY OF STROKE: ICD-10-CM

## 2024-01-10 LAB
ANION GAP SERPL CALCULATED.3IONS-SCNC: 11 MMOL/L (ref 7–15)
BUN SERPL-MCNC: 11.2 MG/DL (ref 8–23)
CALCIUM SERPL-MCNC: 9 MG/DL (ref 8.8–10.2)
CHLORIDE SERPL-SCNC: 102 MMOL/L (ref 98–107)
CHOLEST SERPL-MCNC: 253 MG/DL
CREAT SERPL-MCNC: 0.75 MG/DL (ref 0.51–0.95)
CRP SERPL HS-MCNC: 1.18 MG/L
DEPRECATED HCO3 PLAS-SCNC: 25 MMOL/L (ref 22–29)
EGFRCR SERPLBLD CKD-EPI 2021: 85 ML/MIN/1.73M2
FASTING STATUS PATIENT QL REPORTED: YES
GLUCOSE SERPL-MCNC: 93 MG/DL (ref 70–99)
HDLC SERPL-MCNC: 115 MG/DL
LDLC SERPL CALC-MCNC: 125 MG/DL
NONHDLC SERPL-MCNC: 138 MG/DL
POTASSIUM SERPL-SCNC: 4.3 MMOL/L (ref 3.4–5.3)
SODIUM SERPL-SCNC: 138 MMOL/L (ref 135–145)
TRIGL SERPL-MCNC: 64 MG/DL

## 2024-01-10 PROCEDURE — 86141 C-REACTIVE PROTEIN HS: CPT

## 2024-01-10 PROCEDURE — 80061 LIPID PANEL: CPT

## 2024-01-10 PROCEDURE — 36415 COLL VENOUS BLD VENIPUNCTURE: CPT

## 2024-01-10 PROCEDURE — 80048 BASIC METABOLIC PNL TOTAL CA: CPT

## 2024-01-11 ASSESSMENT — ENCOUNTER SYMPTOMS
BREAST MASS: 0
ARTHRALGIAS: 1
JOINT SWELLING: 0
SHORTNESS OF BREATH: 0
DIZZINESS: 0
ABDOMINAL PAIN: 0
CONSTIPATION: 0
NAUSEA: 0
WEAKNESS: 0
MYALGIAS: 0
NERVOUS/ANXIOUS: 0
PARESTHESIAS: 0
COUGH: 0
CHILLS: 0
FEVER: 0
HEADACHES: 0
DYSURIA: 0
HEARTBURN: 0
FREQUENCY: 0
HEMATOCHEZIA: 0
HEMATURIA: 0
SORE THROAT: 0
PALPITATIONS: 0
EYE PAIN: 0
DIARRHEA: 0

## 2024-01-11 ASSESSMENT — ACTIVITIES OF DAILY LIVING (ADL): CURRENT_FUNCTION: NO ASSISTANCE NEEDED

## 2024-01-18 ENCOUNTER — OFFICE VISIT (OUTPATIENT)
Dept: FAMILY MEDICINE | Facility: CLINIC | Age: 69
End: 2024-01-18
Payer: MEDICARE

## 2024-01-18 VITALS
OXYGEN SATURATION: 98 % | WEIGHT: 169 LBS | HEIGHT: 63 IN | DIASTOLIC BLOOD PRESSURE: 90 MMHG | RESPIRATION RATE: 12 BRPM | BODY MASS INDEX: 29.95 KG/M2 | HEART RATE: 55 BPM | TEMPERATURE: 98.4 F | SYSTOLIC BLOOD PRESSURE: 144 MMHG

## 2024-01-18 DIAGNOSIS — Z85.3 HISTORY OF MALIGNANT NEOPLASM OF BREAST: ICD-10-CM

## 2024-01-18 DIAGNOSIS — Z00.00 HEALTH CARE MAINTENANCE: ICD-10-CM

## 2024-01-18 DIAGNOSIS — I10 PRIMARY HYPERTENSION: ICD-10-CM

## 2024-01-18 DIAGNOSIS — Z00.00 ENCOUNTER FOR MEDICARE ANNUAL WELLNESS EXAM: Primary | ICD-10-CM

## 2024-01-18 PROCEDURE — G0439 PPPS, SUBSEQ VISIT: HCPCS | Performed by: INTERNAL MEDICINE

## 2024-01-18 ASSESSMENT — ENCOUNTER SYMPTOMS
ABDOMINAL PAIN: 0
EYE PAIN: 0
FEVER: 0
HEMATURIA: 0
DIARRHEA: 0
DYSURIA: 0
NAUSEA: 0
PALPITATIONS: 0
CONSTIPATION: 0
SHORTNESS OF BREATH: 0
NERVOUS/ANXIOUS: 0
ARTHRALGIAS: 1
DIZZINESS: 0
FREQUENCY: 0
HEADACHES: 0
MYALGIAS: 0
CHILLS: 0
JOINT SWELLING: 0
COUGH: 0
WEAKNESS: 0
SORE THROAT: 0

## 2024-01-18 ASSESSMENT — ACTIVITIES OF DAILY LIVING (ADL): CURRENT_FUNCTION: NO ASSISTANCE NEEDED

## 2024-01-18 NOTE — PROGRESS NOTES
"Preventive Care Visit  [facility]  Hong Park MD, Internal Medicine  Jan 18, 2024      SUBJECTIVE:   Lynne is a 68 year old, presenting for the following: Returns for regular preventative evaluation.  Generally doing well.  Shares concerns about several health illnesses Intersurgical).  She feels well.  Still doing yoga on a twice week basis.  Walks generally most days to 3 miles.  Had questions posed from her [facility] clinic; recommendations for stress echocardiogram for patients with prior radiation usage.  Eliminated aspirin and her bruisability decreased.  Has seen some more arthritic related pains without use.  Physical        1/18/2024     3:43 PM   Additional Questions   Roomed by LA     Are you in the first 12 months of your Medicare coverage?  No    Healthy Habits:     In general, how would you rate your overall health?  Excellent    Frequency of exercise:  6-7 days/week    Duration of exercise:  Greater than 60 minutes    Do you usually eat at least 4 servings of fruit and vegetables a day, include whole grains    & fiber and avoid regularly eating high fat or \"junk\" foods?  Yes    Taking medications regularly:  Yes    Barriers to taking medications:  None    Medication side effects:  None, No muscle aches and No significant flushing    Ability to successfully perform activities of daily living:  No assistance needed    Home Safety:  No safety concerns identified    Hearing Impairment:  No hearing concerns    In the past 6 months, have you been bothered by leaking of urine? Yes    In general, how would you rate your overall mental or emotional health?  Good    Additional concerns today:  No    Answers submitted by the patient for this visit:  Annual Preventive Visit (Submitted on 1/11/2024)  Chief Complaint: Annual Exam:  Blood in stool: No  heartburn: No  peripheral edema: No  mood changes: No  Skin sensation changes: No  tenderness: No  breast mass: No  breast " discharge: No      Today's PHQ-2 Score:       1/18/2024     9:55 AM   PHQ-2 ( 1999 Pfizer)   Q1: Little interest or pleasure in doing things 1   Q2: Feeling down, depressed or hopeless 1   PHQ-2 Score 2   Q1: Little interest or pleasure in doing things Several days   Q2: Feeling down, depressed or hopeless Several days   PHQ-2 Score 2           Have you ever done Advance Care Planning? (For example, a Health Directive, POLST, or a discussion with a medical provider or your loved ones about your wishes): Yes, patient states has an Advance Care Planning document and will bring a copy to the clinic.      Fall risk  Fallen 2 or more times in the past year?: No  Any fall with injury in the past year?: No    Cognitive Screening normal cognition      Reviewed and updated as needed this visit by clinical staff   Tobacco  Allergies  Meds              Reviewed and updated as needed this visit by Provider                  Social History     Tobacco Use    Smoking status: Never     Passive exposure: Never    Smokeless tobacco: Never   Substance Use Topics    Alcohol use: Yes     Comment: A glass of wine every month or two             1/11/2024     4:43 PM   Alcohol Use   Prescreen: >3 drinks/day or >7 drinks/week? No     Do you have a current opioid prescription? No  Do you use any other controlled substances or medications that are not prescribed by a provider? None        Current providers sharing in care for this patient include:   Patient Care Team:  Hong Park MD as PCP - General (HOSPITALIST)  Hong Park MD as Assigned PCP    The following health maintenance items are reviewed in Epic and correct as of today:  Health Maintenance   Topic Date Due    RSV VACCINE (Pregnancy & 60+) (1 - 1-dose 60+ series) Never done    ANNUAL REVIEW OF HM ORDERS  12/21/2023    MEDICARE ANNUAL WELLNESS VISIT  12/21/2023    FALL RISK ASSESSMENT  01/18/2025    MAMMO SCREENING  10/09/2025    COLORECTAL CANCER SCREENING   "01/25/2026    DTAP/TDAP/TD IMMUNIZATION (4 - Td or Tdap) 11/29/2026    ADVANCE CARE PLANNING  12/21/2027    LIPID  01/10/2029    DEXA  11/08/2034    PHQ-2 (once per calendar year)  Completed    INFLUENZA VACCINE  Completed    Pneumococcal Vaccine: 65+ Years  Completed    ZOSTER IMMUNIZATION  Completed    COVID-19 Vaccine  Completed    IPV IMMUNIZATION  Aged Out    HPV IMMUNIZATION  Aged Out    MENINGITIS IMMUNIZATION  Aged Out    RSV MONOCLONAL ANTIBODY  Aged Out    HEPATITIS C SCREENING  Discontinued     Current Outpatient Medications   Medication Sig Dispense Refill    cholecalciferol (VITAMIN D3) 25 mcg (1000 units) capsule Take 1 capsule by mouth daily      lisinopril (ZESTRIL) 10 MG tablet Take 1 tablet (10 mg) by mouth daily 90 tablet 3    vitamin B-Complex Take 1 tablet by mouth daily With Vit C      VITAMIN E PO       aspirin 81 MG EC tablet Take by mouth daily               OBJECTIVE:   BP (!) 130/92 (BP Location: Left arm, Patient Position: Sitting, Cuff Size: Adult Large)   Pulse 55   Temp 98.4  F (36.9  C) (Tympanic)   Resp 12   Ht 1.588 m (5' 2.5\")   Wt 76.7 kg (169 lb)   SpO2 98%   BMI 30.42 kg/m     Estimated body mass index is 30.42 kg/m  as calculated from the following:    Height as of this encounter: 1.588 m (5' 2.5\").    Weight as of this encounter: 76.7 kg (169 lb).  Review of Systems   Constitutional:  Negative for chills and fever.   HENT:  Negative for congestion, ear pain, hearing loss and sore throat.    Eyes:  Negative for pain and visual disturbance.   Respiratory:  Negative for cough and shortness of breath.    Cardiovascular:  Negative for chest pain and palpitations.   Gastrointestinal:  Negative for abdominal pain, constipation, diarrhea and nausea.   Genitourinary:  Positive for urgency. Negative for dysuria, frequency, genital sores, hematuria, pelvic pain, vaginal bleeding and vaginal discharge.   Musculoskeletal:  Positive for arthralgias. Negative for joint swelling and " myalgias.   Skin:  Negative for rash.   Neurological:  Negative for dizziness, weakness and headaches.   Psychiatric/Behavioral:  The patient is not nervous/anxious.         Physical Exam  GENERAL: alert and no distress  NECK: no adenopathy, no asymmetry, masses, or scars  RESP: lungs clear to auscultation - no rales, rhonchi or wheezes  CV: regular rate and rhythm, normal S1 S2, no S3 or S4, no murmur, click or rub, no peripheral edema  ABDOMEN: soft, nontender, no hepatosplenomegaly, no masses and bowel sounds normal  MS: no gross musculoskeletal defects noted, no edema    Diagnostic Test Results:  Labs reviewed in Epic    ASSESSMENT / PLAN:         Assessment & Plan   Problem List Items Addressed This Visit          Circulatory    Primary hypertension     controlled   - lisinopril 10mg daily            Other    History of malignant neoplasm of breast     h/o stage I breast Ca, '11; + sentinel node; negative right axillary LN dissection; high Onco-score   - treated with XRT and chemo   - completed 4.5 yrs of Arimidex; stopped due to polyarthralgia   - maintains surveillance through [place] for mammography         Health care maintenance     - DEXA, '19 - normal     - no definitive plans to recheck   - PHQ9: normal   - no longer needing PAP   - normal Cologuard (1/2023); repeat in '26   - adult vaccinations updated          Other Visit Diagnoses       Encounter for Medicare annual wellness exam    -  Primary             Counseling  Appropriate preventive services were discussed with this patient, including applicable screening as appropriate for fall prevention, nutrition, physical activity, Tobacco-use cessation, weight loss and cognition.  Checklist reviewing preventive services available has been given to the patient.  Updated plan of care.  Patient reported difficulty with activities of daily living were addressed today.           FUTURE APPOINTMENTS:       - Follow-up visit in 1 yr      Counseling  Reviewed  preventive health counseling, as reflected in patient instructions        She reports that she has never smoked. She has never been exposed to tobacco smoke. She has never used smokeless tobacco.      Appropriate preventive services were discussed with this patient, including applicable screening as appropriate for fall prevention, nutrition, physical activity, Tobacco-use cessation, weight loss and cognition.  Checklist reviewing preventive services available has been given to the patient.    Reviewed patients plan of care and provided an AVS. The Basic Care Plan (routine screening as documented in Health Maintenance) for Lynne meets the Care Plan requirement. This Care Plan has been established and reviewed with the Patient.          Signed Electronically by: Hong Park MD    Identified Health Risks  I have reviewed Opioid Use Disorder and Substance Use Disorder risk factors and made any needed referrals.

## 2024-01-18 NOTE — PATIENT INSTRUCTIONS
Patient Education   Personalized Prevention Plan  You are due for the preventive services outlined below.  Your care team is available to assist you in scheduling these services.  If you have already completed any of these items, please share that information with your care team to update in your medical record.  Health Maintenance Due   Topic Date Due     RSV VACCINE (Pregnancy & 60+) (1 - 1-dose 60+ series) Never done     Annual Wellness Visit  12/21/2023     Bladder Training: Care Instructions  Your Care Instructions     Bladder training is used to treat urge incontinence and stress incontinence. Urge incontinence means that the need to urinate comes on so fast that you can't get to a toilet in time. Stress incontinence means that you leak urine because of pressure on your bladder. For example, it may happen when you laugh, cough, or lift something heavy.  Bladder training can increase how long you can wait before you have to urinate. It can also help your bladder hold more urine. And it can give you better control over the urge to urinate.  It is important to remember that bladder training takes a few weeks to a few months to make a difference. You may not see results right away, but don't give up.  Follow-up care is a key part of your treatment and safety. Be sure to make and go to all appointments, and call your doctor if you are having problems. It's also a good idea to know your test results and keep a list of the medicines you take.  How can you care for yourself at home?  Work with your doctor to come up with a bladder training program that is right for you. You may use one or more of the following methods.  Delayed urination  In the beginning, try to keep from urinating for 5 minutes after you first feel the need to go.  While you wait, take deep, slow breaths to relax. Kegel exercises can also help you delay the need to go to the bathroom.  After some practice, when you can easily wait 5 minutes to  "urinate, try to wait 10 minutes before you urinate.  Slowly increase the waiting period until you are able to control when you have to urinate.  Scheduled urination  Empty your bladder when you first wake up in the morning.  Schedule times throughout the day when you will urinate.  Start by going to the bathroom every hour, even if you don't need to go.  Slowly increase the time between trips to the bathroom.  When you have found a schedule that works well for you, keep doing it.  If you wake up during the night and have to urinate, do it. Apply your schedule to waking hours only.  Kegel exercises  These tighten and strengthen pelvic muscles, which can help you control the flow of urine. (If doing these exercises causes pain, stop doing them and talk with your doctor.) To do Kegel exercises:  Squeeze your muscles as if you were trying not to pass gas. Or squeeze your muscles as if you were stopping the flow of urine. Your belly, legs, and buttocks shouldn't move.  Hold the squeeze for 3 seconds, then relax for 5 to 10 seconds.  Start with 3 seconds, then add 1 second each week until you are able to squeeze for 10 seconds.  Repeat the exercise 10 times a session. Do 3 to 8 sessions a day.  When should you call for help?  Watch closely for changes in your health, and be sure to contact your doctor if:    Your incontinence is getting worse.     You do not get better as expected.   Where can you learn more?  Go to https://www.[employer].net/patiented  Enter V684 in the search box to learn more about \"Bladder Training: Care Instructions.\"  Current as of: February 28, 2023               Content Version: 13.8    9178-1097 [employer].   Care instructions adapted under license by your healthcare professional. If you have questions about a medical condition or this instruction, always ask your healthcare professional. [employer] disclaims any warranty or liability for your use of this " information.

## 2024-01-18 NOTE — ASSESSMENT & PLAN NOTE
h/o stage I breast Ca, '11; + sentinel node; negative right axillary LN dissection; high Onco-score   - treated with XRT and chemo   - completed 4.5 yrs of Arimidex; stopped due to polyarthralgia   - maintains surveillance through [place] for mammography

## 2024-01-18 NOTE — ASSESSMENT & PLAN NOTE
- DEXA, '19 - normal     - no definitive plans to recheck   - PHQ9: normal   - no longer needing PAP   - normal Cologuard (1/2023); repeat in '26   - adult vaccinations updated

## 2024-07-26 ENCOUNTER — TELEPHONE (OUTPATIENT)
Dept: FAMILY MEDICINE | Facility: CLINIC | Age: 69
End: 2024-07-26
Payer: MEDICARE

## 2024-07-26 NOTE — TELEPHONE ENCOUNTER
RN COVID TREATMENT VISIT  07/26/24      The patient has been triaged and does not require a higher level of care.    Lynne Pierson  69 year old  Current weight? 169    Has the patient been seen by a primary care provider at an [facility] or [facility] within the past two years? Yes.   Have you been in close proximity to/do you have a known exposure to a person with a confirmed case of influenza? No.     General treatment eligibility:  Date of positive COVID test (PCR or at home)?  7/26/24    Are you or have you been hospitalized for this COVID-19 infection? No.   Have you received monoclonal antibodies or antiviral treatment for COVID-19 since this positive test? No.   Do you have any of the following conditions that place you at risk of being very sick from COVID-19?   - Age 50 years or older  - Heart conditions such as cardiomyopathies, congenital heart defects, coronary artery disease, heart arrhythmias, heart failure, hypertension, valve disorders      Patient on Day 5 of symptoms. Discussed that if wanting Paxlovid, will have to start the prescription today to qualify. She expressed understanding.If interested, she will check with insurance for coverage and will return call to clinic to complete protocol with RN.

## 2025-01-14 ENCOUNTER — LAB (OUTPATIENT)
Dept: LAB | Facility: CLINIC | Age: 70
End: 2025-01-14
Payer: MEDICARE

## 2025-01-14 DIAGNOSIS — I10 PRIMARY HYPERTENSION: ICD-10-CM

## 2025-01-14 DIAGNOSIS — E78.5 DYSLIPIDEMIA: ICD-10-CM

## 2025-01-14 LAB
ANION GAP SERPL CALCULATED.3IONS-SCNC: 10 MMOL/L (ref 7–15)
BUN SERPL-MCNC: 10.5 MG/DL (ref 8–23)
CALCIUM SERPL-MCNC: 9.2 MG/DL (ref 8.8–10.4)
CHLORIDE SERPL-SCNC: 102 MMOL/L (ref 98–107)
CHOLEST SERPL-MCNC: 238 MG/DL
CREAT SERPL-MCNC: 0.73 MG/DL (ref 0.51–0.95)
EGFRCR SERPLBLD CKD-EPI 2021: 87 ML/MIN/1.73M2
FASTING STATUS PATIENT QL REPORTED: ABNORMAL
FASTING STATUS PATIENT QL REPORTED: NORMAL
GLUCOSE SERPL-MCNC: 90 MG/DL (ref 70–99)
HCO3 SERPL-SCNC: 26 MMOL/L (ref 22–29)
HDLC SERPL-MCNC: 106 MG/DL
LDLC SERPL CALC-MCNC: 120 MG/DL
NONHDLC SERPL-MCNC: 132 MG/DL
POTASSIUM SERPL-SCNC: 4.2 MMOL/L (ref 3.4–5.3)
SODIUM SERPL-SCNC: 138 MMOL/L (ref 135–145)
TRIGL SERPL-MCNC: 59 MG/DL

## 2025-01-14 PROCEDURE — 80061 LIPID PANEL: CPT

## 2025-01-14 PROCEDURE — 36415 COLL VENOUS BLD VENIPUNCTURE: CPT

## 2025-01-14 PROCEDURE — 80048 BASIC METABOLIC PNL TOTAL CA: CPT

## 2025-01-17 SDOH — HEALTH STABILITY: PHYSICAL HEALTH: ON AVERAGE, HOW MANY DAYS PER WEEK DO YOU ENGAGE IN MODERATE TO STRENUOUS EXERCISE (LIKE A BRISK WALK)?: 7 DAYS

## 2025-01-17 SDOH — HEALTH STABILITY: PHYSICAL HEALTH: ON AVERAGE, HOW MANY MINUTES DO YOU ENGAGE IN EXERCISE AT THIS LEVEL?: 60 MIN

## 2025-01-17 ASSESSMENT — SOCIAL DETERMINANTS OF HEALTH (SDOH): HOW OFTEN DO YOU GET TOGETHER WITH FRIENDS OR RELATIVES?: TWICE A WEEK

## 2025-01-22 ENCOUNTER — OFFICE VISIT (OUTPATIENT)
Dept: FAMILY MEDICINE | Facility: CLINIC | Age: 70
End: 2025-01-22
Payer: MEDICARE

## 2025-01-22 VITALS
SYSTOLIC BLOOD PRESSURE: 162 MMHG | WEIGHT: 173 LBS | OXYGEN SATURATION: 98 % | HEART RATE: 53 BPM | RESPIRATION RATE: 22 BRPM | DIASTOLIC BLOOD PRESSURE: 90 MMHG | BODY MASS INDEX: 31.83 KG/M2 | HEIGHT: 62 IN | TEMPERATURE: 97.5 F

## 2025-01-22 DIAGNOSIS — Z85.3 HISTORY OF MALIGNANT NEOPLASM OF BREAST: ICD-10-CM

## 2025-01-22 DIAGNOSIS — N32.81 OVERACTIVE BLADDER: ICD-10-CM

## 2025-01-22 DIAGNOSIS — I10 PRIMARY HYPERTENSION: ICD-10-CM

## 2025-01-22 DIAGNOSIS — Z00.00 HEALTH CARE MAINTENANCE: Primary | ICD-10-CM

## 2025-01-22 DIAGNOSIS — C50.911 MALIGNANT NEOPLASM OF RIGHT FEMALE BREAST, UNSPECIFIED ESTROGEN RECEPTOR STATUS, UNSPECIFIED SITE OF BREAST (H): ICD-10-CM

## 2025-01-22 DIAGNOSIS — R63.5 WEIGHT GAIN: ICD-10-CM

## 2025-01-22 LAB — TSH SERPL DL<=0.005 MIU/L-ACNC: 1.28 UIU/ML (ref 0.3–4.2)

## 2025-01-22 PROCEDURE — 99213 OFFICE O/P EST LOW 20 MIN: CPT | Mod: 25 | Performed by: INTERNAL MEDICINE

## 2025-01-22 PROCEDURE — 84443 ASSAY THYROID STIM HORMONE: CPT | Performed by: INTERNAL MEDICINE

## 2025-01-22 PROCEDURE — G0439 PPPS, SUBSEQ VISIT: HCPCS | Performed by: INTERNAL MEDICINE

## 2025-01-22 PROCEDURE — 36415 COLL VENOUS BLD VENIPUNCTURE: CPT | Performed by: INTERNAL MEDICINE

## 2025-01-22 RX ORDER — LISINOPRIL 10 MG/1
10 TABLET ORAL DAILY
Qty: 90 TABLET | Refills: 3 | Status: SHIPPED | OUTPATIENT
Start: 2025-01-22

## 2025-01-22 NOTE — ASSESSMENT & PLAN NOTE
h/o stage I breast Ca, '11; + sentinel node; negative right axillary LN dissection; high Onco-score   - treated with XRT and chemo   - completed 4.5 yrs of Arimidex; stopped due to polyarthralgia   - maintains surveillance through [place] for mammography

## 2025-01-22 NOTE — PROGRESS NOTES
"Preventive Care Visit  [facility]  Hong Park MD, Internal Medicine  Jan 22, 2025      Assessment & Plan   Problem List Items Addressed This Visit          Circulatory    Primary hypertension     controlled   - lisinopril 10mg daily         Relevant Medications    lisinopril (ZESTRIL) 10 MG tablet    Other Relevant Orders    TSH with free T4 reflex       Urinary    Overactive bladder     Sounds a mixture of both stress and urge incontinence features  -Consideration for future pelvic floor rehab  -Consensus to avoid medication introduction for now            Other    History of malignant neoplasm of breast     h/o stage I breast Ca, '11; + sentinel node; negative right axillary LN dissection; high Onco-score   - treated with XRT and chemo   - completed 4.5 yrs of Arimidex; stopped due to polyarthralgia   - maintains surveillance through [place] for mammography         Health care maintenance - Primary     - DEXA, '19 - normal     - no definitive plans to recheck   - PHQ9: normal   - no longer needing PAP   - normal Cologuard (1/2023); repeat in '26   - adult vaccinations updated         Relevant Orders    REVIEW OF HEALTH MAINTENANCE PROTOCOL ORDERS (Completed)     Other Visit Diagnoses       Malignant neoplasm of right female breast, unspecified estrogen receptor status, unspecified site of breast (H)        Weight gain        Concerns with weight trends despite efforts of weight maintenance.  Checking TSH to assess thyroid axis    Relevant Orders    TSH with free T4 reflex                BMI  Estimated body mass index is 31.64 kg/m  as calculated from the following:    Height as of this encounter: 1.575 m (5' 2\").    Weight as of this encounter: 78.5 kg (173 lb).   Weight management plan: Discussed healthy diet and exercise guidelines    Counseling  Appropriate preventive services were addressed with this patient via screening, questionnaire, or discussion as appropriate for fall " prevention, nutrition, physical activity, Tobacco-use cessation, social engagement, weight loss and cognition.  Checklist reviewing preventive services available has been given to the patient.  Reviewed patient's diet, addressing concerns and/or questions.   Discussed possible causes of fatigue. Information on urinary incontinence and treatment options given to patient.     FUTURE APPOINTMENTS:       - Follow-up visit in 12 months      Larry Batista is a 69 year old, presenting for the following: Returns for follow-up.  Doing well.  Stays engaged with her family.  Good discussions about life and state of our nation.  She shares having blood pressure monitoring through [employer] for regular donations -no concerns about hypertension raised during these encounters.  She does bring in a log of home blood pressure recordings as well.  Having issues with overactive bladder and incontinence -not any worse than in the past.  Specifically would like to avoid medications for now.  Does avoid caffeinated beverages and alcohol intake because of bladder issues.  Reviewed health maintenance issues.  Current with mammography completed last fall.  Medicare Visit        1/22/2025     9:12 AM   Additional Questions   Roomed by Chelsey LAMBERT    Health Care Directive  Patient does not have a Health Care Directive: Patient states has Advance Directive and will bring in a copy to clinic.      1/17/2025   General Health   How would you rate your overall physical health? Excellent   Feel stress (tense, anxious, or unable to sleep) Only a little   (!) STRESS CONCERN      1/17/2025   Nutrition   Diet: Regular (no restrictions)         1/17/2025   Exercise   Days per week of moderate/strenous exercise 7 days   Average minutes spent exercising at this level 60 min         1/17/2025   Social Factors   Frequency of gathering with friends or relatives Twice a week   Worry food won't last until get money to buy more No   Food not  last or not have enough money for food? No   Do you have housing? (Housing is defined as stable permanent housing and does not include staying ouside in a car, in a tent, in an abandoned building, in an overnight shelter, or couch-surfing.) Yes   Are you worried about losing your housing? No   Lack of transportation? No   Unable to get utilities (heat,electricity)? No         1/17/2025   Fall Risk   Fallen 2 or more times in the past year? No   Trouble with walking or balance? No          1/17/2025   Activities of Daily Living- Home Safety   Needs help with the following daily activites None of the above   Safety concerns in the home None of the above         1/17/2025   Dental   Dentist two times every year? Yes         1/17/2025   Hearing Screening   Hearing concerns? None of the above         1/17/2025   Driving Risk Screening   Patient/family members have concerns about driving No         1/17/2025   General Alertness/Fatigue Screening   Have you been more tired than usual lately? (!) YES         1/17/2025   Urinary Incontinence Screening   Bothered by leaking urine in past 6 months Yes         1/17/2025   TB Screening   Were you born outside of the [place]? No         Today's PHQ-2 Score:       1/22/2025     9:08 AM   PHQ-2 ( 1999 Pfizer)   Q1: Little interest or pleasure in doing things 0   Q2: Feeling down, depressed or hopeless 0   PHQ-2 Score 0    Q1: Little interest or pleasure in doing things Not at all   Q2: Feeling down, depressed or hopeless Not at all   PHQ-2 Score 0       Patient-reported           1/17/2025   Substance Use   Alcohol more than 3/day or more than 7/wk No   Do you have a current opioid prescription? No   How severe/bad is pain from 1 to 10? 1/10   Do you use any other substances recreationally? No     Social History     Tobacco Use    Smoking status: Never     Passive exposure: Never    Smokeless tobacco: Never   Vaping Use    Vaping status: Never Used   Substance Use Topics    Alcohol use:  Yes     Comment: A glass of wine every month or two    Drug use: Never           12/14/2022   LAST FHS-7 RESULTS   1st degree relative breast or ovarian cancer Yes   Any relative bilateral breast cancer No   Any male have breast cancer No   Any ONE woman have BOTH breast AND ovarian cancer No   Any woman with breast cancer before 50yrs No   2 or more relatives with breast AND/OR ovarian cancer Yes   2 or more relatives with breast AND/OR bowel cancer Yes            ASCVD Risk   The ASCVD Risk score (Shellie DK, et al., 2019) failed to calculate for the following reasons:    The valid HDL cholesterol range is 20 to 100 mg/dL            Reviewed and updated as needed this visit by Provider                      Current providers sharing in care for this patient include:  Patient Care Team:  Hong Park MD as PCP - General (HOSPITALIST)  Hong Park MD as Assigned PCP    The following health maintenance items are reviewed in Epic and correct as of today:  Health Maintenance   Topic Date Due    ANNUAL REVIEW OF HM ORDERS  01/18/2025    MEDICARE ANNUAL WELLNESS VISIT  01/18/2025    BMP  01/14/2026    FALL RISK ASSESSMENT  01/22/2026    COLORECTAL CANCER SCREENING  01/25/2026    MAMMO SCREENING  10/29/2026    DTAP/TDAP/TD IMMUNIZATION (4 - Td or Tdap) 11/29/2026    GLUCOSE  01/14/2028    ADVANCE CARE PLANNING  01/18/2029    LIPID  01/14/2030    DEXA  11/08/2034    PHQ-2 (once per calendar year)  Completed    INFLUENZA VACCINE  Completed    Pneumococcal Vaccine: 50+ Years  Completed    ZOSTER IMMUNIZATION  Completed    RSV VACCINE  Completed    COVID-19 Vaccine  Completed    HPV IMMUNIZATION  Aged Out    MENINGITIS IMMUNIZATION  Aged Out    RSV MONOCLONAL ANTIBODY  Aged Out    HEPATITIS C SCREENING  Discontinued         Review of Systems  Constitutional, HEENT, cardiovascular, pulmonary, gi and gu systems are negative, except as otherwise noted.     Objective    Exam  BP (!) 161/80   Pulse 53   Temp  "97.5  F (36.4  C)   Resp (!) 22   Ht 1.575 m (5' 2\")   Wt 78.5 kg (173 lb)   LMP  (LMP Unknown)   SpO2 98%   BMI 31.64 kg/m     Estimated body mass index is 31.64 kg/m  as calculated from the following:    Height as of this encounter: 1.575 m (5' 2\").    Weight as of this encounter: 78.5 kg (173 lb).    Physical Exam  GENERAL: alert and no distress  NECK: no adenopathy, no asymmetry, masses, or scars  RESP: lungs clear to auscultation - no rales, rhonchi or wheezes  CV: regular rate and rhythm, normal S1 S2, no S3 or S4, no murmur, click or rub, no peripheral edema  ABDOMEN: soft, nontender, no hepatosplenomegaly, no masses and bowel sounds normal  MS: no gross musculoskeletal defects noted, no edema         1/22/2025   Mini Cog   Clock Draw Score 2 Normal   3 Item Recall 3 objects recalled   Mini Cog Total Score 5              Signed Electronically by: Hong Park MD    "

## 2025-01-22 NOTE — ASSESSMENT & PLAN NOTE
Sounds a mixture of both stress and urge incontinence features  -Consideration for future pelvic floor rehab  -Consensus to avoid medication introduction for now

## 2025-02-04 ENCOUNTER — VIRTUAL VISIT (OUTPATIENT)
Dept: FAMILY MEDICINE | Facility: CLINIC | Age: 70
End: 2025-02-04
Payer: MEDICARE

## 2025-02-04 DIAGNOSIS — I10 PRIMARY HYPERTENSION: Primary | ICD-10-CM

## 2025-02-04 PROCEDURE — 98013 SYNCH AUDIO-ONLY EST LOW 20: CPT | Performed by: INTERNAL MEDICINE

## 2025-02-04 NOTE — PROGRESS NOTES
Lynne is a 69 year old who is being evaluated via a billable telephone visit.    What phone number would you like to be contacted at? [phone]  How would you like to obtain your AVS? Jennifer  Originating Location (pt. Location): Home    Distant Location (provider location):  On-site  Telephone visit completed due to the patient did not have access to video, while the distant provider did.    Assessment & Plan   Problem List Items Addressed This Visit          Circulatory    Primary hypertension - Primary     controlled   - lisinopril 10mg daily -suspect quite sensitive to lisinopril dosing.  Some variation in home blood pressure seen with differences in generic formulation  -Encouraged continued home blood pressure monitoring every 1 to 2 weeks; to update me if seeing consistently average blood pressures greater than 140/90                   Subjective   Lynne is a 69 year old, presenting for the following health issues: Telephone visit raising concerns about her blood pressure control since her last visit.  She opted to change her medication with a new prescription.  Since doing that she feels like her home blood pressures have been notably under better control.  Questions whether her generic formulation change could have explain these changes.  Did have blood pressures measured through her blood donation, 130s over 70s at that time.  Feels well.  Has further questions about frequency of blood pressures and what numbers she should be worried about at home  Hypertension        2/4/2025     8:25 AM   Additional Questions   Roomed by Chelsey BELTRE     History of Present Illness       Hypertension: She presents for follow up of hypertension.  She does check blood pressure  regularly outside of the clinic. Outside blood pressures have been over 140/90. She follows a low salt diet.     She eats 2-3 servings of fruits and vegetables daily.She consumes 0 sweetened beverage(s) daily.She exercises with enough effort to  increase her heart rate 60 or more minutes per day.  She exercises with enough effort to increase her heart rate 7 days per week.   She is taking medications regularly.         Review of Systems  Constitutional, HEENT, cardiovascular, pulmonary, gi and gu systems are negative, except as otherwise noted.      Objective    Vitals - Patient Reported  Systolic (Patient Reported): 127  Diastolic (Patient Reported): 74        Physical Exam   General: Alert and no distress //Respiratory: No audible wheeze, cough, or shortness of breath // Psychiatric:  Appropriate affect, tone, and pace of words            Phone call duration: 15 minutes  Signed Electronically by: Hong Park MD

## 2025-02-04 NOTE — ASSESSMENT & PLAN NOTE
controlled   - lisinopril 10mg daily -suspect quite sensitive to lisinopril dosing.  Some variation in home blood pressure seen with differences in generic formulation  -Encouraged continued home blood pressure monitoring every 1 to 2 weeks; to update me if seeing consistently average blood pressures greater than 140/90